# Patient Record
Sex: MALE | Race: WHITE | Employment: OTHER | URBAN - METROPOLITAN AREA
[De-identification: names, ages, dates, MRNs, and addresses within clinical notes are randomized per-mention and may not be internally consistent; named-entity substitution may affect disease eponyms.]

---

## 2017-08-28 PROBLEM — Z86.79 H/O ATRIAL FIBRILLATION WITHOUT CURRENT MEDICATION: Chronic | Status: ACTIVE | Noted: 2017-08-28

## 2017-08-28 PROBLEM — E78.5 HYPERLIPIDEMIA: Chronic | Status: ACTIVE | Noted: 2017-08-28

## 2017-08-28 PROBLEM — Z86.73 H/O: STROKE: Chronic | Status: ACTIVE | Noted: 2017-08-28

## 2018-01-02 PROBLEM — I65.23 ASYMPTOMATIC BILATERAL CAROTID ARTERY STENOSIS: Chronic | Status: ACTIVE | Noted: 2018-01-02

## 2018-02-06 PROBLEM — N32.0 BLADDER OUTLET OBSTRUCTION: Status: ACTIVE | Noted: 2018-02-06

## 2018-02-06 PROBLEM — N39.0 UTI (URINARY TRACT INFECTION): Status: ACTIVE | Noted: 2018-02-06

## 2018-02-06 PROBLEM — N13.30 HYDRONEPHROSIS: Status: ACTIVE | Noted: 2018-02-06

## 2018-02-06 PROBLEM — A41.9 SEPSIS (HCC): Status: ACTIVE | Noted: 2018-02-06

## 2018-03-12 ENCOUNTER — HOSPITAL ENCOUNTER (OUTPATIENT)
Dept: PHARMACY | Age: 83
Discharge: HOME | End: 2018-03-12

## 2018-03-12 ENCOUNTER — TELEPHONE (OUTPATIENT)
Dept: PHARMACY | Age: 83
End: 2018-03-12

## 2018-03-12 DIAGNOSIS — I48.91 ATRIAL FIBRILLATION AND FLUTTER (HCC): ICD-10-CM

## 2018-03-12 DIAGNOSIS — I48.92 ATRIAL FIBRILLATION AND FLUTTER (HCC): ICD-10-CM

## 2018-03-12 LAB — INTERNATIONAL NORMALIZATION RATIO, POC: 3.6

## 2018-03-12 NOTE — PROGRESS NOTES
HOME CARE NOTE:    Patient is currently receiving home care services via: [facility]    Med list updated last on: MED LIST REQUESTED TODAY    INR today is 3.6, goal 2-3.   INR reflects: 2 mg s/m/w/f and 1 mg t/r/s per outside records  Warfarin dosing orders: HOLD today then resume MD    Orders given to: Faustino Tracy  Next INR ordered for: 3/15/18   Next INR will reflect: only 9 mg per week d/t held dose on 3/12

## 2018-03-15 ENCOUNTER — HOSPITAL ENCOUNTER (OUTPATIENT)
Dept: PHARMACY | Age: 83
Discharge: HOME | End: 2018-03-15

## 2018-03-15 LAB — INTERNATIONAL NORMALIZATION RATIO, POC: 2.3

## 2018-03-15 RX ORDER — WARFARIN SODIUM 1 MG/1
1 TABLET ORAL
COMMUNITY
End: 2018-05-25 | Stop reason: ALTCHOICE

## 2018-03-15 NOTE — PROGRESS NOTES
HOME CARE NOTE:    Patient is currently receiving home care services via: [address] list updated last on: 3/15/158    INR today is 2.3, goal 2-3.   INR reflects: 9 mg in past week  Warfarin dosing orders: resume 1 mg t/r/s and 2 mg s/m/w/f    Orders given to: left on Jessica's voicemail  Next INR ordered for:  3/22//18  Next INR will reflect: 11 mg per week

## 2018-04-11 PROBLEM — N39.0 UTI (URINARY TRACT INFECTION): Status: RESOLVED | Noted: 2018-02-06 | Resolved: 2018-04-11

## 2018-04-12 ENCOUNTER — HOSPITAL ENCOUNTER (OUTPATIENT)
Dept: PHARMACY | Age: 83
Discharge: HOME | End: 2018-04-12

## 2018-04-12 LAB — INR BLD: 2.6

## 2018-04-12 NOTE — PROGRESS NOTES
HOME CARE NOTE:    Patient is currently receiving home care services via: [address] list updated last on: 4/12/18    INR today is therapeutic at 2.6, goal 2-3. INR reflects 11 mg per week  Warfarin dosing orders: same (1 mg t/r/s and 2 mg s/m/w/f)    Orders given to:  Santa  Next INR ordered for:  5/3/18  Next INR will reflect: 11 mg per week    Aubrey Hutton PharmD 4/12/2018 9:45 AM

## 2018-05-01 ENCOUNTER — APPOINTMENT (OUTPATIENT)
Dept: GENERAL RADIOLOGY | Age: 83
End: 2018-05-01
Payer: MEDICARE

## 2018-05-01 ENCOUNTER — HOSPITAL ENCOUNTER (EMERGENCY)
Age: 83
Discharge: HOME | End: 2018-05-01
Payer: MEDICARE

## 2018-05-01 VITALS
HEIGHT: 67 IN | DIASTOLIC BLOOD PRESSURE: 51 MMHG | RESPIRATION RATE: 16 BRPM | OXYGEN SATURATION: 92 % | HEART RATE: 73 BPM | SYSTOLIC BLOOD PRESSURE: 99 MMHG | BODY MASS INDEX: 26.37 KG/M2 | TEMPERATURE: 98.7 F | WEIGHT: 168 LBS

## 2018-05-01 DIAGNOSIS — R19.7 NAUSEA VOMITING AND DIARRHEA: Primary | ICD-10-CM

## 2018-05-01 DIAGNOSIS — N30.00 ACUTE CYSTITIS WITHOUT HEMATURIA: ICD-10-CM

## 2018-05-01 DIAGNOSIS — R11.2 NAUSEA VOMITING AND DIARRHEA: Primary | ICD-10-CM

## 2018-05-01 LAB
ALBUMIN SERPL-MCNC: 3.5 G/DL (ref 3.5–5.2)
ALP BLD-CCNC: 83 U/L (ref 40–129)
ALT SERPL-CCNC: 21 U/L (ref 0–40)
AMYLASE: 41 U/L (ref 20–100)
ANION GAP SERPL CALCULATED.3IONS-SCNC: 13 MMOL/L (ref 7–16)
APTT: 38.6 SEC (ref 24.5–35.1)
AST SERPL-CCNC: 35 U/L (ref 0–39)
BACTERIA: ABNORMAL /HPF
BASOPHILS ABSOLUTE: 0.03 E9/L (ref 0–0.2)
BASOPHILS RELATIVE PERCENT: 0.3 % (ref 0–2)
BILIRUB SERPL-MCNC: 0.7 MG/DL (ref 0–1.2)
BILIRUBIN URINE: NEGATIVE
BLOOD, URINE: ABNORMAL
BUN BLDV-MCNC: 18 MG/DL (ref 8–23)
CALCIUM SERPL-MCNC: 8.7 MG/DL (ref 8.6–10.2)
CASTS 2: ABNORMAL /LPF
CASTS: ABNORMAL /LPF
CHLORIDE BLD-SCNC: 100 MMOL/L (ref 98–107)
CLARITY: ABNORMAL
CO2: 24 MMOL/L (ref 22–29)
COLOR: ABNORMAL
CREAT SERPL-MCNC: 0.9 MG/DL (ref 0.7–1.2)
EKG ATRIAL RATE: 77 BPM
EKG P AXIS: 53 DEGREES
EKG P-R INTERVAL: 206 MS
EKG Q-T INTERVAL: 408 MS
EKG QRS DURATION: 84 MS
EKG QTC CALCULATION (BAZETT): 461 MS
EKG R AXIS: 4 DEGREES
EKG T AXIS: 32 DEGREES
EKG VENTRICULAR RATE: 77 BPM
EOSINOPHILS ABSOLUTE: 0.05 E9/L (ref 0.05–0.5)
EOSINOPHILS RELATIVE PERCENT: 0.5 % (ref 0–6)
EPITHELIAL CELLS, UA: ABNORMAL /HPF
GFR AFRICAN AMERICAN: >60
GFR NON-AFRICAN AMERICAN: >60 ML/MIN/1.73
GLUCOSE BLD-MCNC: 298 MG/DL (ref 74–109)
GLUCOSE URINE: 500 MG/DL
HCT VFR BLD CALC: 38.4 % (ref 37–54)
HEMOGLOBIN: 12.6 G/DL (ref 12.5–16.5)
IMMATURE GRANULOCYTES #: 0.09 E9/L
IMMATURE GRANULOCYTES %: 0.8 % (ref 0–5)
INR BLD: 2.7
KETONES, URINE: ABNORMAL MG/DL
LACTIC ACID: 1.5 MMOL/L (ref 0.5–2.2)
LEUKOCYTE ESTERASE, URINE: ABNORMAL
LIPASE: 13 U/L (ref 13–60)
LYMPHOCYTES ABSOLUTE: 1.13 E9/L (ref 1.5–4)
LYMPHOCYTES RELATIVE PERCENT: 10.4 % (ref 20–42)
MCH RBC QN AUTO: 30.4 PG (ref 26–35)
MCHC RBC AUTO-ENTMCNC: 32.8 % (ref 32–34.5)
MCV RBC AUTO: 92.8 FL (ref 80–99.9)
MONOCYTES ABSOLUTE: 1.03 E9/L (ref 0.1–0.95)
MONOCYTES RELATIVE PERCENT: 9.4 % (ref 2–12)
NEUTROPHILS ABSOLUTE: 8.58 E9/L (ref 1.8–7.3)
NEUTROPHILS RELATIVE PERCENT: 78.6 % (ref 43–80)
NITRITE, URINE: POSITIVE
PDW BLD-RTO: 13.7 FL (ref 11.5–15)
PH UA: 5 (ref 5–9)
PLATELET # BLD: 169 E9/L (ref 130–450)
PMV BLD AUTO: 9.9 FL (ref 7–12)
POTASSIUM SERPL-SCNC: 4 MMOL/L (ref 3.5–5)
PROTEIN UA: 100 MG/DL
PROTHROMBIN TIME: 30.8 SEC (ref 9.3–12.4)
RBC # BLD: 4.14 E12/L (ref 3.8–5.8)
RBC UA: >20 /HPF (ref 0–2)
SODIUM BLD-SCNC: 137 MMOL/L (ref 132–146)
SPECIFIC GRAVITY UA: 1.02 (ref 1–1.03)
TOTAL PROTEIN: 6.6 G/DL (ref 6.4–8.3)
TROPONIN: <0.01 NG/ML (ref 0–0.03)
UROBILINOGEN, URINE: 2 E.U./DL
WBC # BLD: 10.9 E9/L (ref 4.5–11.5)
WBC UA: >20 /HPF (ref 0–5)

## 2018-05-01 PROCEDURE — 85025 COMPLETE CBC W/AUTO DIFF WBC: CPT

## 2018-05-01 PROCEDURE — 81001 URINALYSIS AUTO W/SCOPE: CPT

## 2018-05-01 PROCEDURE — 80053 COMPREHEN METABOLIC PANEL: CPT

## 2018-05-01 PROCEDURE — 6360000002 HC RX W HCPCS: Performed by: NURSE PRACTITIONER

## 2018-05-01 PROCEDURE — 6360000002 HC RX W HCPCS

## 2018-05-01 PROCEDURE — 85610 PROTHROMBIN TIME: CPT

## 2018-05-01 PROCEDURE — 83690 ASSAY OF LIPASE: CPT

## 2018-05-01 PROCEDURE — 82150 ASSAY OF AMYLASE: CPT

## 2018-05-01 PROCEDURE — 93005 ELECTROCARDIOGRAM TRACING: CPT | Performed by: NURSE PRACTITIONER

## 2018-05-01 PROCEDURE — 96375 TX/PRO/DX INJ NEW DRUG ADDON: CPT

## 2018-05-01 PROCEDURE — 2580000003 HC RX 258: Performed by: NURSE PRACTITIONER

## 2018-05-01 PROCEDURE — 74018 RADEX ABDOMEN 1 VIEW: CPT

## 2018-05-01 PROCEDURE — 85730 THROMBOPLASTIN TIME PARTIAL: CPT

## 2018-05-01 PROCEDURE — 83605 ASSAY OF LACTIC ACID: CPT

## 2018-05-01 PROCEDURE — 96365 THER/PROPH/DIAG IV INF INIT: CPT

## 2018-05-01 PROCEDURE — 84484 ASSAY OF TROPONIN QUANT: CPT

## 2018-05-01 PROCEDURE — 99284 EMERGENCY DEPT VISIT MOD MDM: CPT

## 2018-05-01 RX ORDER — ONDANSETRON 2 MG/ML
4 INJECTION INTRAMUSCULAR; INTRAVENOUS ONCE
Status: COMPLETED | OUTPATIENT
Start: 2018-05-01 | End: 2018-05-01

## 2018-05-01 RX ORDER — ONDANSETRON 2 MG/ML
INJECTION INTRAMUSCULAR; INTRAVENOUS
Status: COMPLETED
Start: 2018-05-01 | End: 2018-05-01

## 2018-05-01 RX ORDER — 0.9 % SODIUM CHLORIDE 0.9 %
1000 INTRAVENOUS SOLUTION INTRAVENOUS ONCE
Status: COMPLETED | OUTPATIENT
Start: 2018-05-01 | End: 2018-05-01

## 2018-05-01 RX ORDER — NITROFURANTOIN 25; 75 MG/1; MG/1
100 CAPSULE ORAL 2 TIMES DAILY
Qty: 10 CAPSULE | Refills: 0 | Status: SHIPPED | OUTPATIENT
Start: 2018-05-01 | End: 2018-05-06

## 2018-05-01 RX ADMIN — SODIUM CHLORIDE 1000 ML: 9 INJECTION, SOLUTION INTRAVENOUS at 02:00

## 2018-05-01 RX ADMIN — PIPERACILLIN SODIUM AND TAZOBACTAM SODIUM 3.38 G: 3; .375 INJECTION, POWDER, LYOPHILIZED, FOR SOLUTION INTRAVENOUS at 03:56

## 2018-05-01 RX ADMIN — ONDANSETRON 4 MG: 2 INJECTION INTRAMUSCULAR; INTRAVENOUS at 02:02

## 2018-05-11 ENCOUNTER — HOSPITAL ENCOUNTER (OUTPATIENT)
Age: 83
Discharge: HOME | End: 2018-05-13
Payer: MEDICARE

## 2018-05-11 PROCEDURE — 87088 URINE BACTERIA CULTURE: CPT

## 2018-05-11 PROCEDURE — 87186 SC STD MICRODIL/AGAR DIL: CPT

## 2018-05-14 LAB
ORGANISM: ABNORMAL
URINE CULTURE, ROUTINE: ABNORMAL
URINE CULTURE, ROUTINE: ABNORMAL

## 2018-05-25 ENCOUNTER — HOSPITAL ENCOUNTER (OUTPATIENT)
Dept: PHARMACY | Age: 83
Setting detail: THERAPIES SERIES
Discharge: HOME | End: 2018-05-25
Payer: MEDICARE

## 2018-05-25 VITALS
DIASTOLIC BLOOD PRESSURE: 69 MMHG | WEIGHT: 180 LBS | HEART RATE: 69 BPM | BODY MASS INDEX: 28.19 KG/M2 | SYSTOLIC BLOOD PRESSURE: 173 MMHG

## 2018-05-25 LAB — INTERNATIONAL NORMALIZATION RATIO, POC: 2.5

## 2018-05-25 PROCEDURE — 99211 OFF/OP EST MAY X REQ PHY/QHP: CPT

## 2018-05-25 PROCEDURE — 85610 PROTHROMBIN TIME: CPT

## 2018-05-25 RX ORDER — LISINOPRIL 10 MG/1
10 TABLET ORAL EVERY MORNING
COMMUNITY

## 2018-05-25 RX ORDER — WARFARIN SODIUM 1 MG/1
1 TABLET ORAL
Status: ON HOLD | COMMUNITY
Start: 2018-06-15 | End: 2018-07-09 | Stop reason: HOSPADM

## 2018-06-12 ENCOUNTER — OFFICE VISIT (OUTPATIENT)
Dept: NON INVASIVE DIAGNOSTICS | Age: 83
End: 2018-06-12
Payer: MEDICARE

## 2018-06-12 VITALS
DIASTOLIC BLOOD PRESSURE: 50 MMHG | WEIGHT: 179 LBS | SYSTOLIC BLOOD PRESSURE: 110 MMHG | RESPIRATION RATE: 16 BRPM | HEART RATE: 58 BPM | BODY MASS INDEX: 28.09 KG/M2 | HEIGHT: 67 IN

## 2018-06-12 DIAGNOSIS — I48.92 ATRIAL FIBRILLATION AND FLUTTER (HCC): Primary | ICD-10-CM

## 2018-06-12 DIAGNOSIS — I48.91 ATRIAL FIBRILLATION AND FLUTTER (HCC): Primary | ICD-10-CM

## 2018-06-12 PROBLEM — N13.30 HYDRONEPHROSIS: Status: RESOLVED | Noted: 2018-02-06 | Resolved: 2018-06-12

## 2018-06-12 PROBLEM — A41.9 SEPSIS (HCC): Status: RESOLVED | Noted: 2018-02-06 | Resolved: 2018-06-12

## 2018-06-12 PROCEDURE — 1123F ACP DISCUSS/DSCN MKR DOCD: CPT | Performed by: NURSE PRACTITIONER

## 2018-06-12 PROCEDURE — 99213 OFFICE O/P EST LOW 20 MIN: CPT | Performed by: NURSE PRACTITIONER

## 2018-06-12 PROCEDURE — G8427 DOCREV CUR MEDS BY ELIG CLIN: HCPCS | Performed by: NURSE PRACTITIONER

## 2018-06-12 PROCEDURE — G8419 CALC BMI OUT NRM PARAM NOF/U: HCPCS | Performed by: NURSE PRACTITIONER

## 2018-06-12 PROCEDURE — G8598 ASA/ANTIPLAT THER USED: HCPCS | Performed by: NURSE PRACTITIONER

## 2018-06-12 PROCEDURE — 1036F TOBACCO NON-USER: CPT | Performed by: NURSE PRACTITIONER

## 2018-06-12 PROCEDURE — 93000 ELECTROCARDIOGRAM COMPLETE: CPT | Performed by: INTERNAL MEDICINE

## 2018-06-12 PROCEDURE — 4040F PNEUMOC VAC/ADMIN/RCVD: CPT | Performed by: NURSE PRACTITIONER

## 2018-06-15 ENCOUNTER — TELEPHONE (OUTPATIENT)
Dept: PHARMACY | Age: 83
End: 2018-06-15

## 2018-06-15 ENCOUNTER — HOSPITAL ENCOUNTER (OUTPATIENT)
Dept: PHARMACY | Age: 83
Setting detail: THERAPIES SERIES
Discharge: HOME | End: 2018-06-15
Payer: MEDICARE

## 2018-06-15 VITALS
BODY MASS INDEX: 28.22 KG/M2 | DIASTOLIC BLOOD PRESSURE: 86 MMHG | SYSTOLIC BLOOD PRESSURE: 139 MMHG | WEIGHT: 180.2 LBS | HEART RATE: 63 BPM

## 2018-06-15 LAB — INTERNATIONAL NORMALIZATION RATIO, POC: 2.4

## 2018-06-15 PROCEDURE — 99211 OFF/OP EST MAY X REQ PHY/QHP: CPT

## 2018-06-15 PROCEDURE — 85610 PROTHROMBIN TIME: CPT

## 2018-06-26 ENCOUNTER — TELEPHONE (OUTPATIENT)
Dept: NON INVASIVE DIAGNOSTICS | Age: 83
End: 2018-06-26

## 2018-07-05 ENCOUNTER — APPOINTMENT (OUTPATIENT)
Dept: CT IMAGING | Age: 83
DRG: 871 | End: 2018-07-05
Payer: MEDICARE

## 2018-07-05 ENCOUNTER — HOSPITAL ENCOUNTER (INPATIENT)
Age: 83
LOS: 4 days | Discharge: NURSING, REHAB OR LONG-TERM CARE | DRG: 871 | End: 2018-07-09
Attending: EMERGENCY MEDICINE | Admitting: INTERNAL MEDICINE
Payer: MEDICARE

## 2018-07-05 DIAGNOSIS — R53.1 GENERAL WEAKNESS: ICD-10-CM

## 2018-07-05 DIAGNOSIS — N30.00 ACUTE CYSTITIS WITHOUT HEMATURIA: Primary | ICD-10-CM

## 2018-07-05 PROBLEM — G93.41 ACUTE METABOLIC ENCEPHALOPATHY: Status: ACTIVE | Noted: 2018-07-05

## 2018-07-05 PROBLEM — N30.01 ACUTE CYSTITIS WITH HEMATURIA: Status: ACTIVE | Noted: 2018-07-05

## 2018-07-05 PROBLEM — N39.0 UTI (URINARY TRACT INFECTION): Status: ACTIVE | Noted: 2018-07-05

## 2018-07-05 PROBLEM — Z86.79 HISTORY OF ATRIAL FIBRILLATION: Status: ACTIVE | Noted: 2018-07-05

## 2018-07-05 LAB
ANION GAP SERPL CALCULATED.3IONS-SCNC: 10 MMOL/L (ref 7–16)
APTT: 36.5 SEC (ref 24.5–35.1)
BACTERIA: ABNORMAL /HPF
BASOPHILS ABSOLUTE: 0.04 E9/L (ref 0–0.2)
BASOPHILS RELATIVE PERCENT: 0.3 % (ref 0–2)
BILIRUBIN URINE: NEGATIVE
BLOOD, URINE: ABNORMAL
BUN BLDV-MCNC: 19 MG/DL (ref 8–23)
CALCIUM SERPL-MCNC: 8.2 MG/DL (ref 8.6–10.2)
CHLORIDE BLD-SCNC: 96 MMOL/L (ref 98–107)
CLARITY: CLEAR
CO2: 29 MMOL/L (ref 22–29)
COLOR: YELLOW
CREAT SERPL-MCNC: 1.1 MG/DL (ref 0.7–1.2)
EKG ATRIAL RATE: 63 BPM
EKG P AXIS: 133 DEGREES
EKG P-R INTERVAL: 212 MS
EKG Q-T INTERVAL: 462 MS
EKG QRS DURATION: 84 MS
EKG QTC CALCULATION (BAZETT): 472 MS
EKG R AXIS: -3 DEGREES
EKG T AXIS: 24 DEGREES
EKG VENTRICULAR RATE: 63 BPM
EOSINOPHILS ABSOLUTE: 0.06 E9/L (ref 0.05–0.5)
EOSINOPHILS RELATIVE PERCENT: 0.5 % (ref 0–6)
GFR AFRICAN AMERICAN: >60
GFR NON-AFRICAN AMERICAN: >60 ML/MIN/1.73
GLUCOSE BLD-MCNC: 255 MG/DL (ref 74–109)
GLUCOSE URINE: NEGATIVE MG/DL
HCT VFR BLD CALC: 35.8 % (ref 37–54)
HEMOGLOBIN: 11.6 G/DL (ref 12.5–16.5)
IMMATURE GRANULOCYTES #: 0.09 E9/L
IMMATURE GRANULOCYTES %: 0.7 % (ref 0–5)
INR BLD: 3.2
KETONES, URINE: NEGATIVE MG/DL
LACTIC ACID: 2.3 MMOL/L (ref 0.5–2.2)
LEUKOCYTE ESTERASE, URINE: ABNORMAL
LYMPHOCYTES ABSOLUTE: 0.73 E9/L (ref 1.5–4)
LYMPHOCYTES RELATIVE PERCENT: 5.7 % (ref 20–42)
MCH RBC QN AUTO: 30.4 PG (ref 26–35)
MCHC RBC AUTO-ENTMCNC: 32.4 % (ref 32–34.5)
MCV RBC AUTO: 93.7 FL (ref 80–99.9)
METER GLUCOSE: 177 MG/DL (ref 70–110)
METER GLUCOSE: 178 MG/DL (ref 70–110)
MONOCYTES ABSOLUTE: 0.62 E9/L (ref 0.1–0.95)
MONOCYTES RELATIVE PERCENT: 4.8 % (ref 2–12)
NEUTROPHILS ABSOLUTE: 11.35 E9/L (ref 1.8–7.3)
NEUTROPHILS RELATIVE PERCENT: 88 % (ref 43–80)
NITRITE, URINE: POSITIVE
PDW BLD-RTO: 13.9 FL (ref 11.5–15)
PH UA: 6 (ref 5–9)
PLATELET # BLD: 155 E9/L (ref 130–450)
PMV BLD AUTO: 10.7 FL (ref 7–12)
POTASSIUM REFLEX MAGNESIUM: 4.1 MMOL/L (ref 3.5–5)
PROTEIN UA: 100 MG/DL
PROTHROMBIN TIME: 34.9 SEC (ref 9.3–12.4)
RBC # BLD: 3.82 E12/L (ref 3.8–5.8)
RBC UA: ABNORMAL /HPF (ref 0–2)
SODIUM BLD-SCNC: 135 MMOL/L (ref 132–146)
SPECIFIC GRAVITY UA: 1.01 (ref 1–1.03)
UROBILINOGEN, URINE: 2 E.U./DL
WBC # BLD: 12.9 E9/L (ref 4.5–11.5)
WBC UA: ABNORMAL /HPF (ref 0–5)

## 2018-07-05 PROCEDURE — 99223 1ST HOSP IP/OBS HIGH 75: CPT | Performed by: INTERNAL MEDICINE

## 2018-07-05 PROCEDURE — 36415 COLL VENOUS BLD VENIPUNCTURE: CPT

## 2018-07-05 PROCEDURE — 99285 EMERGENCY DEPT VISIT HI MDM: CPT

## 2018-07-05 PROCEDURE — 6360000002 HC RX W HCPCS: Performed by: EMERGENCY MEDICINE

## 2018-07-05 PROCEDURE — 83605 ASSAY OF LACTIC ACID: CPT

## 2018-07-05 PROCEDURE — 2580000003 HC RX 258: Performed by: EMERGENCY MEDICINE

## 2018-07-05 PROCEDURE — 87088 URINE BACTERIA CULTURE: CPT

## 2018-07-05 PROCEDURE — 70486 CT MAXILLOFACIAL W/O DYE: CPT

## 2018-07-05 PROCEDURE — 82962 GLUCOSE BLOOD TEST: CPT

## 2018-07-05 PROCEDURE — 80048 BASIC METABOLIC PNL TOTAL CA: CPT

## 2018-07-05 PROCEDURE — 1200000000 HC SEMI PRIVATE

## 2018-07-05 PROCEDURE — 94761 N-INVAS EAR/PLS OXIMETRY MLT: CPT

## 2018-07-05 PROCEDURE — 87186 SC STD MICRODIL/AGAR DIL: CPT

## 2018-07-05 PROCEDURE — 2580000003 HC RX 258: Performed by: NURSE PRACTITIONER

## 2018-07-05 PROCEDURE — 93005 ELECTROCARDIOGRAM TRACING: CPT | Performed by: EMERGENCY MEDICINE

## 2018-07-05 PROCEDURE — 6370000000 HC RX 637 (ALT 250 FOR IP): Performed by: NURSE PRACTITIONER

## 2018-07-05 PROCEDURE — 70450 CT HEAD/BRAIN W/O DYE: CPT

## 2018-07-05 PROCEDURE — 85610 PROTHROMBIN TIME: CPT

## 2018-07-05 PROCEDURE — 96365 THER/PROPH/DIAG IV INF INIT: CPT

## 2018-07-05 PROCEDURE — 85025 COMPLETE CBC W/AUTO DIFF WBC: CPT

## 2018-07-05 PROCEDURE — 85730 THROMBOPLASTIN TIME PARTIAL: CPT

## 2018-07-05 PROCEDURE — 81001 URINALYSIS AUTO W/SCOPE: CPT

## 2018-07-05 RX ORDER — SIMVASTATIN 40 MG
40 TABLET ORAL NIGHTLY
Status: DISCONTINUED | OUTPATIENT
Start: 2018-07-05 | End: 2018-07-09 | Stop reason: HOSPADM

## 2018-07-05 RX ORDER — SODIUM CHLORIDE 0.9 % (FLUSH) 0.9 %
10 SYRINGE (ML) INJECTION EVERY 12 HOURS SCHEDULED
Status: DISCONTINUED | OUTPATIENT
Start: 2018-07-05 | End: 2018-07-09 | Stop reason: HOSPADM

## 2018-07-05 RX ORDER — INSULIN GLARGINE 100 [IU]/ML
23 INJECTION, SOLUTION SUBCUTANEOUS EVERY MORNING
Status: DISCONTINUED | OUTPATIENT
Start: 2018-07-06 | End: 2018-07-09 | Stop reason: HOSPADM

## 2018-07-05 RX ORDER — SODIUM CHLORIDE 0.9 % (FLUSH) 0.9 %
10 SYRINGE (ML) INJECTION PRN
Status: DISCONTINUED | OUTPATIENT
Start: 2018-07-05 | End: 2018-07-09 | Stop reason: HOSPADM

## 2018-07-05 RX ORDER — MIRTAZAPINE 15 MG/1
15 TABLET, FILM COATED ORAL NIGHTLY
Status: DISCONTINUED | OUTPATIENT
Start: 2018-07-05 | End: 2018-07-09 | Stop reason: HOSPADM

## 2018-07-05 RX ORDER — VITS A,C,E/LUTEIN/MINERALS 300MCG-200
1 TABLET ORAL 2 TIMES DAILY
Status: DISCONTINUED | OUTPATIENT
Start: 2018-07-05 | End: 2018-07-09 | Stop reason: HOSPADM

## 2018-07-05 RX ORDER — NITROFURANTOIN MACROCRYSTALS 50 MG/1
50 CAPSULE ORAL SEE ADMIN INSTRUCTIONS
Status: ON HOLD | COMMUNITY
End: 2018-07-09 | Stop reason: HOSPADM

## 2018-07-05 RX ORDER — DEXTROSE MONOHYDRATE 25 G/50ML
12.5 INJECTION, SOLUTION INTRAVENOUS PRN
Status: DISCONTINUED | OUTPATIENT
Start: 2018-07-05 | End: 2018-07-09 | Stop reason: HOSPADM

## 2018-07-05 RX ORDER — WARFARIN SODIUM 2 MG/1
2 TABLET ORAL
Status: ON HOLD | COMMUNITY
End: 2018-07-09 | Stop reason: HOSPADM

## 2018-07-05 RX ORDER — NICOTINE POLACRILEX 4 MG
15 LOZENGE BUCCAL PRN
Status: DISCONTINUED | OUTPATIENT
Start: 2018-07-05 | End: 2018-07-09 | Stop reason: HOSPADM

## 2018-07-05 RX ORDER — FOLIC ACID 1 MG/1
1 TABLET ORAL EVERY MORNING
Status: DISCONTINUED | OUTPATIENT
Start: 2018-07-06 | End: 2018-07-09 | Stop reason: HOSPADM

## 2018-07-05 RX ORDER — 0.9 % SODIUM CHLORIDE 0.9 %
1000 INTRAVENOUS SOLUTION INTRAVENOUS ONCE
Status: COMPLETED | OUTPATIENT
Start: 2018-07-05 | End: 2018-07-05

## 2018-07-05 RX ORDER — DEXTROSE MONOHYDRATE 50 MG/ML
100 INJECTION, SOLUTION INTRAVENOUS PRN
Status: DISCONTINUED | OUTPATIENT
Start: 2018-07-05 | End: 2018-07-09 | Stop reason: HOSPADM

## 2018-07-05 RX ORDER — ANTIOX #8/OM3/DHA/EPA/LUT/ZEAX 250-2.5 MG
1 CAPSULE ORAL 2 TIMES DAILY
Status: DISCONTINUED | OUTPATIENT
Start: 2018-07-05 | End: 2018-07-05 | Stop reason: CLARIF

## 2018-07-05 RX ORDER — SOTALOL HYDROCHLORIDE 120 MG/1
120 TABLET ORAL EVERY MORNING
Status: DISCONTINUED | OUTPATIENT
Start: 2018-07-06 | End: 2018-07-09 | Stop reason: HOSPADM

## 2018-07-05 RX ORDER — LISINOPRIL 10 MG/1
10 TABLET ORAL EVERY MORNING
Status: DISCONTINUED | OUTPATIENT
Start: 2018-07-06 | End: 2018-07-09 | Stop reason: HOSPADM

## 2018-07-05 RX ADMIN — SODIUM CHLORIDE 1000 ML: 9 INJECTION, SOLUTION INTRAVENOUS at 10:46

## 2018-07-05 RX ADMIN — Medication 10 ML: at 20:50

## 2018-07-05 RX ADMIN — MEROPENEM 1 G: 1 INJECTION, POWDER, FOR SOLUTION INTRAVENOUS at 14:53

## 2018-07-05 RX ADMIN — MEROPENEM 1 G: 1 INJECTION, POWDER, FOR SOLUTION INTRAVENOUS at 22:47

## 2018-07-05 RX ADMIN — SIMVASTATIN 40 MG: 40 TABLET, FILM COATED ORAL at 20:50

## 2018-07-05 RX ADMIN — Medication 1 TABLET: at 20:50

## 2018-07-05 RX ADMIN — MIRTAZAPINE 15 MG: 15 TABLET, FILM COATED ORAL at 20:50

## 2018-07-05 RX ADMIN — INSULIN LISPRO 1 UNITS: 100 INJECTION, SOLUTION INTRAVENOUS; SUBCUTANEOUS at 20:49

## 2018-07-05 ASSESSMENT — PAIN SCALES - GENERAL
PAINLEVEL_OUTOF10: 0
PAINLEVEL_OUTOF10: 0

## 2018-07-05 NOTE — ED PROVIDER NOTES
[oxycodone-acetaminophen]    -------------------------------------------------- RESULTS -------------------------------------------------  All laboratory and radiology results have been personally reviewed by myself   LABS:  Results for orders placed or performed during the hospital encounter of 07/05/18   Urine culture   Result Value Ref Range    Urine Culture, Routine      Organism Escherichia coli (A)     Urine Culture, Routine >100,000 CFU/ml  Sensitivity to follow      CBC auto differential   Result Value Ref Range    WBC 12.9 (H) 4.5 - 11.5 E9/L    RBC 3.82 3.80 - 5.80 E12/L    Hemoglobin 11.6 (L) 12.5 - 16.5 g/dL    Hematocrit 35.8 (L) 37.0 - 54.0 %    MCV 93.7 80.0 - 99.9 fL    MCH 30.4 26.0 - 35.0 pg    MCHC 32.4 32.0 - 34.5 %    RDW 13.9 11.5 - 15.0 fL    Platelets 510 666 - 125 E9/L    MPV 10.7 7.0 - 12.0 fL    Neutrophils % 88.0 (H) 43.0 - 80.0 %    Immature Granulocytes % 0.7 0.0 - 5.0 %    Lymphocytes % 5.7 (L) 20.0 - 42.0 %    Monocytes % 4.8 2.0 - 12.0 %    Eosinophils % 0.5 0.0 - 6.0 %    Basophils % 0.3 0.0 - 2.0 %    Neutrophils # 11.35 (H) 1.80 - 7.30 E9/L    Immature Granulocytes # 0.09 E9/L    Lymphocytes # 0.73 (L) 1.50 - 4.00 E9/L    Monocytes # 0.62 0.10 - 0.95 E9/L    Eosinophils # 0.06 0.05 - 0.50 E9/L    Basophils # 0.04 0.00 - 0.20 E9/L   Urinalysis   Result Value Ref Range    Color, UA Yellow Straw/Yellow    Clarity, UA Clear Clear    Glucose, Ur Negative Negative mg/dL    Bilirubin Urine Negative Negative    Ketones, Urine Negative Negative mg/dL    Specific Gravity, UA 1.010 1.005 - 1.030    Blood, Urine LARGE (A) Negative    pH, UA 6.0 5.0 - 9.0    Protein,  (A) Negative mg/dL    Urobilinogen, Urine 2.0 (A) <2.0 E.U./dL    Nitrite, Urine POSITIVE (A) Negative    Leukocyte Esterase, Urine MODERATE (A) Negative   Lactic acid, plasma   Result Value Ref Range    Lactic Acid 2.3 (H) 0.5 - 2.2 mmol/L   Basic Metabolic Panel w/ Reflex to MG   Result Value Ref Range    Sodium 135 132 - Glucose   Result Value Ref Range    Meter Glucose 167 (H) 70 - 110 mg/dL   POCT Glucose   Result Value Ref Range    Meter Glucose 164 (H) 70 - 110 mg/dL   EKG 12 Lead   Result Value Ref Range    Ventricular Rate 63 BPM    Atrial Rate 63 BPM    P-R Interval 212 ms    QRS Duration 84 ms    Q-T Interval 462 ms    QTc Calculation (Bazett) 472 ms    P Axis 133 degrees    R Axis -3 degrees    T Axis 24 degrees       RADIOLOGY:  Interpreted by Radiologist.  CT ABDOMEN PELVIS WO CONTRAST   Final Result      CT FACIAL BONES WO CONTRAST   Final Result   1. Study limited by suboptimal postprocessing. Reprocessed images have   been requested. 2. No obvious fracture. 3. 7-mm x 3-mm foreign body in the subcutaneous tissue overlying the   left zygoma, possibly a small piece of metal.      4. Mild maxillary sinus mucosal thickening, left greater than right. CT Head WO Contrast   Final Result   ALERT THIS IS AN ABNORMAL REPORT. Findings communicated   directly with Dr. Castro Landon at approximately 7/5/2018 at 1137 hours. 1. There is a possible fracture of the left maxilla versus motion   artifact. CT facial bones without contrast is recommended. 2. Moderate cerebral volume loss/atrophy. 3. Moderate white matter changes consistent with sequelae small vessel   ischemic disease. 4. Vascular calcifications within the bilateral internal carotid   artery cavernous segments and the vertebral arteries. .   5. Small amount layering fluid in the bilateral maxillary sinuses. ------------------------- NURSING NOTES AND VITALS REVIEWED ---------------------------   The nursing notes within the ED encounter and vital signs as below have been reviewed.    /60   Pulse 93   Temp 98 °F (36.7 °C) (Oral)   Resp 18   Ht 5' 7\" (1.702 m)   Wt 186 lb (84.4 kg)   SpO2 100%   BMI 29.13 kg/m²   Oxygen Saturation Interpretation: Normal      ---------------------------------------------------PHYSICAL injection 1 mg (not administered)   dextrose 5 % solution (not administered)   warfarin (COUMADIN) tablet 2 mg (not administered)   ertapenem (INVANZ) 1 g IVPB minibag (0 mg Intravenous Stopped 7/6/18 1352)   0.9 % sodium chloride bolus (0 mLs Intravenous Stopped 7/5/18 1234)         Medical Decision Making:    Check labs and urine---findings noted---[facility] Clinical Pharmacy---will begin Meropenem due to ESBL---give IVF---CT head negative----no facial fracture----no metal noted on clinical exam----DW Dr Alex Brennan and Dr Prince Watson for admit    Counseling: The emergency provider has spoken with the patient and spouse/SO and discussed todays results, in addition to providing specific details for the plan of care and counseling regarding the diagnosis and prognosis. Questions are answered at this time and they are agreeable with the plan.      --------------------------------- IMPRESSION AND DISPOSITION ---------------------------------    IMPRESSION  1. Acute cystitis without hematuria    2.  General weakness        DISPOSITION  Disposition: Admit to telemetry  Patient condition is stable                  Ananth Barnhart MD  07/06/18 0535

## 2018-07-05 NOTE — H&P
Addendum: I have personally participated in the history, exam, medical decision making with Magali Earl NP on the date of service and I agree with all of the pertinent clinical information unless otherwise noted. I have also reviewed and agree with the past medical, family, and social history unless otherwise noted. Please link this note to the NP/AP note dated 7/5/2018   80year-old male presented with 3 day history of confusion and fever with hematuria    PHYSICAL EXAM:  Vitals:  /60   Pulse 80   Temp 99.8 °F (37.7 °C) (Oral)   Resp 18   Ht 5' 7\" (1.702 m)   Wt 180 lb (81.6 kg)   SpO2 95%   BMI 28.19 kg/m²   Lungs are clear to auscultation bilaterally no crackles no wheezing. Heart D5-F3, systolic murmur  Abdomen soft nontender nondistended positive bowel sounds. Extremities full range of motion +1 pedal edema. Impression:  Principal Problem:    Acute cystitis with hematuria  Active Problems:    DM2 (diabetes mellitus, type 2) ([facility] [address]    Hyperlipidemia    Essential hypertension    General weakness    UTI (urinary tract infection)    History of atrial fibrillation  Resolved Problems:    * No resolved hospital problems. *      My findings/plan include:  1. Acute metabolic encephalopathy, most likely due to UTI, we will treat underlying process and monitor. 2.  UTI complicated with sepsis, meeting QSofa, previous cultures showed ESBL E. coli, will start the patient on meropenem and consult ID. Unfortunately was not allowed to use ertapenem. 3.  Hematuria, most likely due to acute cystitis, consult to neurology. 4.  Falls due to encephalopathy, will obtain PT and OT    NOTE: This report was transcribed using voice recognition software. Every effort was made to ensure accuracy; however, inadvertent computerized transcription errors may be present.   Electronically signed by Laurel Florian MD on 7/5/2018 at 6:18 PM

## 2018-07-05 NOTE — H&P
History of nephrolithiasis     [place] (hard of hearing)     Hyperlipidemia     Hypertension     Macular degeneration     Skin cancer of scalp 07/2014    Unspecified cerebral artery occlusion with cerebral infarction     mini strokes    Uses walker        Surgical History:  Past Surgical History:   Procedure Laterality Date    CAROTID ENDARTERECTOMY Left 10-7-14    Dr. Mickey Ojeda  11/18/2014    cysto retrograde TURP pyelogram    CYSTOSCOPY Left 07/11/2017    Ureteroscopy; Laser [place]-B-Tfvay on Left    CYSTOSCOPY Left 08/24/2017    Stent removal and replacement    HERNIA REPAIR      KIDNEY STONE SURGERY      SKIN BIOPSY  7/2014    at top of head       Medications Prior to Admission:    Prior to Admission medications    Medication Sig Start Date End Date Taking? Authorizing Provider   warfarin (COUMADIN) 2 MG tablet Take 2 mg by mouth four times a week 2 mg= Sunday, Monday, Wednesday, Friday (taken with supper)   Yes Historical Provider, MD   nitrofurantoin (MACRODANTIN) 50 MG capsule Take 50 mg by mouth See Admin Instructions Onset of symptoms: Take 1 capsule by mouth Twice Daily for 3 days.    Yes Historical Provider, MD   sotalol (BETAPACE) 80 MG tablet Take 1.5 tablets by mouth daily  Patient taking differently: Take 120 mg by mouth every morning  6/25/18  Yes Haley Lee MD   saxagliptin (ONGLYZA) 2.5 MG TABS tablet Take 5 mg by mouth every morning    Yes Historical Provider, MD   insulin glargine (LANTUS SOLOSTAR) 100 UNIT/ML injection pen Inject 23 Units into the skin every morning    Yes Historical Provider, MD   lisinopril (PRINIVIL;ZESTRIL) 10 MG tablet Take 10 mg by mouth every morning    Yes Historical Provider, MD   warfarin (JANTOVEN) 1 MG tablet Take 1 mg by mouth three times a week 1 mg= Tuesday, Thursday, Saturday (taken with supper)   Take as directed by [address] [facility] (Medication Management) 6/15/18  Yes Historical Provider, MD   mirtazapine (Sen Terrance) 15 MG tablet Take 15 mg by mouth nightly   Yes Historical Provider, MD   acetaminophen (TYLENOL) 500 MG tablet Take 1,000 mg by mouth every 6 hours as needed for Pain or Fever    Yes Historical Provider, MD   Multiple Vitamins-Minerals (PRESERVISION AREDS 2) CAPS Take 1 tablet by mouth 2 times daily    Yes Historical Provider, MD   b complex vitamins capsule Take 1 capsule by mouth every morning    Yes Historical Provider, MD   simvastatin (ZOCOR) 40 MG tablet Take 40 mg by mouth nightly. Yes Historical Provider, MD   folic acid (FOLVITE) 1 MG tablet Take 1 mg by mouth every morning    Yes Historical Provider, MD   aspirin 81 MG EC tablet Take 81 mg by mouth every morning     Historical Provider, MD       Allergies:    Bacitracin and Percocet [oxycodone-acetaminophen]    Social History:    reports that he quit smoking about 19 years ago. His smoking use included Cigarettes. He started smoking about 63 years ago. He smoked 1.00 pack per day. His smokeless tobacco use includes Snuff. He reports that he does not drink alcohol or use drugs. Family History:   Family History   Problem Relation Age of Onset    Heart Disease Mother     Heart Disease Father     Heart Disease Brother        PHYSICAL EXAM:  Vitals:  /60   Pulse 80   Temp 99.8 °F (37.7 °C) (Oral)   Resp 18   Ht 5' 7\" (1.702 m)   Wt 180 lb (81.6 kg)   SpO2 95%   BMI 28.19 kg/m²   General Appearance: alert and oriented to person, place and time, well-developed and well-nourished, in no acute distress  Skin: warm and dry. Reddened abrasion areas noted to right facial region   Head: normocephalic and atraumatic  Eyes: pupils equal, round, and reactive to light, extraocular eye movements intact, conjunctivae normal  ENT: hearing grossly normal bilaterally  Neck: neck supple and non tender without mass   Pulmonary/Chest: clear to auscultation bilaterally- no wheezes, rales or rhonchi, normal air movement, no respiratory distress.  On room examination was performed on a CT scanner with dose reduction. Technique: Low-dose CT  acquisition technique included one of following options; 1 . Automated exposure control, 2. Adjustment of MA and or KV according to patient's size or 3. Use of iterative reconstruction. No intraparenchymal hemorrhage. No extra-axial fluid collection or hematoma. Vascular calcifications within the bilateral internal carotid artery cavernous segments and the vertebral arteries. . Moderate cerebral body loss/atrophy. Hypodensities bilateral centrum semiovale and periventricular regions. Layering fluid noted in the maxillary sinuses. Possible fracture of the left maxilla versus possible motion artifact. . Hypodensities in the bilateral centrum semiovale and periventricular regions. ALERT THIS IS AN ABNORMAL REPORT. Findings communicated directly with Dr. Janet Parks at approximately 2018 at 1137 hours. 1. There is a possible fracture of the left maxilla versus motion artifact. CT facial bones without contrast is recommended. 2. Moderate cerebral volume loss/atrophy. 3. Moderate white matter changes consistent with sequelae small vessel ischemic disease. 4. Vascular calcifications within the bilateral internal carotid artery cavernous segments and the vertebral arteries. . 5. Small amount layering fluid in the bilateral maxillary sinuses. Ct Facial Bones Wo Contrast    Result Date: 2018  Patient MRN:  [id number] : 1935 Age: 80 years Gender: Male Order Date:  2018 11:45 AM Exam: CT FACIAL BONES WO CONTRAST Indication:  maxillary fracture  Number of Images: 814 Technique: Computed tomographic imaging of the facial bones was performed without intravenous contrast. Images were reconstructed in the axial plane in thin sections using standard and bone algorithms. Images were also reformatted in coronal and sagittal planes. Comparison: Correlation is made with contemporaneously performed CT study of the head.  FINDINGS:  The study is limited secondary to obliquity in the reconstructed imaging planes and lack of true bone algorithm reconstructions in the nonaxial planes. A small, 7-mm x 3-mm foreign body is noted in the subcutaneous tissue overlying the left zygoma. No obvious fracture is identified. There is mild mucosal thickening in the maxillary sinuses, left greater than right. The nasal septum is deviated to the right. The optic globes appear intact. The extraocular muscles are grossly unremarkable. There is no infiltration of the orbital fat. The pharynx is clear and unremarkable. The parapharyngeal soft tissues are grossly unremarkable. 1. Study limited by suboptimal postprocessing. Reprocessed images have been requested. 2. No obvious fracture. 3. 7-mm x 3-mm foreign body in the subcutaneous tissue overlying the left zygoma, possibly a small piece of metal. 4. Mild maxillary sinus mucosal thickening, left greater than right.       EK2018 11:02 AM - Eloy, Mhy Incoming Ekg Results From [facility]     Component Results     Component Value Ref Range & Units Status Collected Lab   Ventricular Rate 63  BPM Incomplete 2018 10:50 AM [facility]   Atrial Rate 63  BPM Incomplete 2018 10:50 AM [facility]   P-R Interval 212  ms Incomplete 2018 10:50 AM [facility]   QRS Duration 84  ms Incomplete 2018 10:50 AM [facility]   Q-T Interval 462  ms Incomplete 2018 10:50 AM [facility]   QTc Calculation (Bazett) 472  ms Incomplete 2018 10:50 AM [facility]   P Pool 133  degrees Incomplete 2018 10:50 AM [facility]   R Axis -3  degrees Incomplete 2018 10:50 AM [facility]   T Pool 24  degrees Incomplete 2018 10:50 AM [facility]   Testing Performed By     Lab - Abbreviation Name Director Address Valid Date Range   360-[facility] MUSE Unknown Unknown 16 1121-Present   Narrative     Unusual P axis, possible ectopic atrial rhythm  Low voltage QRS         ASSESSMENT:      Principal Problem:   Acute metabolic

## 2018-07-06 ENCOUNTER — APPOINTMENT (OUTPATIENT)
Dept: CT IMAGING | Age: 83
DRG: 871 | End: 2018-07-06
Payer: MEDICARE

## 2018-07-06 LAB
ALBUMIN SERPL-MCNC: 3.4 G/DL (ref 3.5–5.2)
ALP BLD-CCNC: 86 U/L (ref 40–129)
ALT SERPL-CCNC: 19 U/L (ref 0–40)
ANION GAP SERPL CALCULATED.3IONS-SCNC: 10 MMOL/L (ref 7–16)
AST SERPL-CCNC: 25 U/L (ref 0–39)
BILIRUB SERPL-MCNC: 0.9 MG/DL (ref 0–1.2)
BUN BLDV-MCNC: 14 MG/DL (ref 8–23)
CALCIUM SERPL-MCNC: 8.8 MG/DL (ref 8.6–10.2)
CHLORIDE BLD-SCNC: 100 MMOL/L (ref 98–107)
CO2: 29 MMOL/L (ref 22–29)
CREAT SERPL-MCNC: 0.9 MG/DL (ref 0.7–1.2)
GFR AFRICAN AMERICAN: >60
GFR NON-AFRICAN AMERICAN: >60 ML/MIN/1.73
GLUCOSE BLD-MCNC: 141 MG/DL (ref 74–109)
HCT VFR BLD CALC: 37.6 % (ref 37–54)
HEMOGLOBIN: 12.3 G/DL (ref 12.5–16.5)
INR BLD: 2.3
MCH RBC QN AUTO: 30.6 PG (ref 26–35)
MCHC RBC AUTO-ENTMCNC: 32.7 % (ref 32–34.5)
MCV RBC AUTO: 93.5 FL (ref 80–99.9)
METER GLUCOSE: 147 MG/DL (ref 70–110)
METER GLUCOSE: 164 MG/DL (ref 70–110)
METER GLUCOSE: 167 MG/DL (ref 70–110)
METER GLUCOSE: 189 MG/DL (ref 70–110)
PDW BLD-RTO: 13.9 FL (ref 11.5–15)
PLATELET # BLD: 148 E9/L (ref 130–450)
PMV BLD AUTO: 9.8 FL (ref 7–12)
POTASSIUM REFLEX MAGNESIUM: 3.8 MMOL/L (ref 3.5–5)
PROTHROMBIN TIME: 26.6 SEC (ref 9.3–12.4)
RBC # BLD: 4.02 E12/L (ref 3.8–5.8)
SODIUM BLD-SCNC: 139 MMOL/L (ref 132–146)
TOTAL PROTEIN: 6.4 G/DL (ref 6.4–8.3)
WBC # BLD: 12.4 E9/L (ref 4.5–11.5)

## 2018-07-06 PROCEDURE — G8987 SELF CARE CURRENT STATUS: HCPCS

## 2018-07-06 PROCEDURE — 97530 THERAPEUTIC ACTIVITIES: CPT

## 2018-07-06 PROCEDURE — 1200000000 HC SEMI PRIVATE

## 2018-07-06 PROCEDURE — 36415 COLL VENOUS BLD VENIPUNCTURE: CPT

## 2018-07-06 PROCEDURE — G8978 MOBILITY CURRENT STATUS: HCPCS

## 2018-07-06 PROCEDURE — 97161 PT EVAL LOW COMPLEX 20 MIN: CPT

## 2018-07-06 PROCEDURE — G8979 MOBILITY GOAL STATUS: HCPCS

## 2018-07-06 PROCEDURE — 6360000002 HC RX W HCPCS: Performed by: SPECIALIST

## 2018-07-06 PROCEDURE — 2580000003 HC RX 258: Performed by: EMERGENCY MEDICINE

## 2018-07-06 PROCEDURE — 6370000000 HC RX 637 (ALT 250 FOR IP): Performed by: NURSE PRACTITIONER

## 2018-07-06 PROCEDURE — G8988 SELF CARE GOAL STATUS: HCPCS

## 2018-07-06 PROCEDURE — 80053 COMPREHEN METABOLIC PANEL: CPT

## 2018-07-06 PROCEDURE — 85027 COMPLETE CBC AUTOMATED: CPT

## 2018-07-06 PROCEDURE — 85610 PROTHROMBIN TIME: CPT

## 2018-07-06 PROCEDURE — 2580000003 HC RX 258: Performed by: NURSE PRACTITIONER

## 2018-07-06 PROCEDURE — 82962 GLUCOSE BLOOD TEST: CPT

## 2018-07-06 PROCEDURE — 74176 CT ABD & PELVIS W/O CONTRAST: CPT

## 2018-07-06 PROCEDURE — 6360000002 HC RX W HCPCS: Performed by: EMERGENCY MEDICINE

## 2018-07-06 PROCEDURE — 99233 SBSQ HOSP IP/OBS HIGH 50: CPT | Performed by: HOSPITALIST

## 2018-07-06 PROCEDURE — 2580000003 HC RX 258: Performed by: SPECIALIST

## 2018-07-06 RX ORDER — WARFARIN SODIUM 2 MG/1
2 TABLET ORAL
Status: COMPLETED | OUTPATIENT
Start: 2018-07-06 | End: 2018-07-06

## 2018-07-06 RX ADMIN — Medication 1 TABLET: at 20:43

## 2018-07-06 RX ADMIN — MIRTAZAPINE 15 MG: 15 TABLET, FILM COATED ORAL at 20:43

## 2018-07-06 RX ADMIN — FOLIC ACID 1 MG: 1 TABLET ORAL at 09:24

## 2018-07-06 RX ADMIN — SIMVASTATIN 40 MG: 40 TABLET, FILM COATED ORAL at 20:43

## 2018-07-06 RX ADMIN — INSULIN LISPRO 1 UNITS: 100 INJECTION, SOLUTION INTRAVENOUS; SUBCUTANEOUS at 13:08

## 2018-07-06 RX ADMIN — WARFARIN SODIUM 2 MG: 2 TABLET ORAL at 18:57

## 2018-07-06 RX ADMIN — Medication 10 ML: at 20:44

## 2018-07-06 RX ADMIN — INSULIN GLARGINE 23 UNITS: 100 INJECTION, SOLUTION SUBCUTANEOUS at 09:10

## 2018-07-06 RX ADMIN — LISINOPRIL 10 MG: 10 TABLET ORAL at 09:24

## 2018-07-06 RX ADMIN — Medication 1 TABLET: at 09:25

## 2018-07-06 RX ADMIN — SODIUM CHLORIDE 1000 MG: 900 INJECTION INTRAVENOUS at 12:55

## 2018-07-06 RX ADMIN — INSULIN LISPRO 1 UNITS: 100 INJECTION, SOLUTION INTRAVENOUS; SUBCUTANEOUS at 20:43

## 2018-07-06 RX ADMIN — INSULIN LISPRO 1 UNITS: 100 INJECTION, SOLUTION INTRAVENOUS; SUBCUTANEOUS at 18:07

## 2018-07-06 RX ADMIN — SOTALOL HYDROCHLORIDE 120 MG: 120 TABLET ORAL at 09:25

## 2018-07-06 RX ADMIN — Medication 10 ML: at 09:25

## 2018-07-06 RX ADMIN — MEROPENEM 1 G: 1 INJECTION, POWDER, FOR SOLUTION INTRAVENOUS at 06:13

## 2018-07-06 RX ADMIN — INSULIN LISPRO 1 UNITS: 100 INJECTION, SOLUTION INTRAVENOUS; SUBCUTANEOUS at 09:09

## 2018-07-06 ASSESSMENT — PAIN SCALES - GENERAL
PAINLEVEL_OUTOF10: 0
PAINLEVEL_OUTOF10: 0

## 2018-07-06 NOTE — PLAN OF CARE
Problem: Falls - Risk of:  Goal: Will remain free from falls  Will remain free from falls   Outcome: Ongoing      Problem: Risk for Impaired Skin Integrity  Goal: Tissue integrity - skin and mucous membranes  Structural intactness and normal physiological function of skin and  mucous membranes.    Outcome: Met This Shift

## 2018-07-06 NOTE — CONSULTS
7/6/2018 8:36 AM  Service: Urology  Group: [facility] ([facility])    Missy Ahumada  [id number]     Chief Complaint:    Urinary tract infection    History of Present Illness: The patient is a 80 y.o. male patient who presents with urinary tract infection. this patient is known to us. He has had recurrent nephrolithiasis and bladder outlet obstruction. He had TURP on 11/18/2014. On 8/24/2017 he had a cystoscopy removal of a left stent flexible ureteroscopy and nature corporeal holmium laser lithotripsy and reinsertion of the stent. That stent was eventually removed. 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 His postvoid residual in the emergency room at that time was 1200 mL. The patient presented to the emergency room yesterday with the difficulty ambulate ambulating and he's been having fairly recurrent urinary tract infections which have made him week. He had been prescribed Macrodantin from our office and had been on 4 courses of this since May of this year. He apparently still gets occasional chills and fever sensation is temperature is not documented. His weakness lead him to fall today and he was subsequently admitted. Apparently he sustained a possible fracture of the left maxilla.  Apparently he has a 7 x 3 mm foreign body in the subcutaneous tissues overlying the left zygoma which  apparently is an old finding  The patient's wife who is the historian with the patient said that he had been voiding well with nocturia ×2-3 he had developed some lower abdominal pain on July 4 and then by evening had fever became weak fell and thus had the facial injury  She's not sure if he had been completely emptying his bladder      Past Medical History:   Diagnosis Date    Asymptomatic bilateral 95%   BMI 29.13 kg/m²     General:  Awake, alert, oriented X 3. Well developed, well nourished, well groomed. No apparent distress. HEENT:  Normocephalic, atraumatic. Pupils equal, round. No scleral icterus. No conjunctival injection. Normal lips, teeth, and gums. No nasal discharge. Neck:  Supple, no masses. Heart:  RRR  Lungs:  No audible wheezing. Respirations symmetric and non-labored. Abdomen:  soft, nontender, no masses, no organomegaly, no peritoneal signs  Extremities:  No clubbing, cyanosis, or edema  Skin:  Warm and dry, no open lesions or rashes  Neuro:  There are no motor or sensory deficits in the 4 quadrant extremities   Rectal: deferred  Genitalia:  No scrotal edema and no acute epididymoorchitis or penile masses    Labs:     Recent Labs      07/05/18   1037  07/06/18   0340   WBC  12.9*  12.4*   RBC  3.82  4.02   HGB  11.6*  12.3*   HCT  35.8*  37.6   MCV  93.7  93.5   MCH  30.4  30.6   MCHC  32.4  32.7   RDW  13.9  13.9   PLT  155  148   MPV  10.7  9.8         Recent Labs      07/05/18   1112  07/06/18   0340   CREATININE  1.1  0.9         Assessment:  Oralia Andrew 80 y.o. male     The patient is to be assessed for recurrent retention  I did discuss the case with Dr. Nancy Pace of infectious disease imaging will be done to be sure there is no recurrence of emphysematous pyelonephritis or cystitis    Plan:    Repeat imaging and bladder scan will be done today  He may need a [place] catheter    Electronically signed by Boris Hamlin MD on 7/6/2018 at 8:36 AM

## 2018-07-06 NOTE — CARE COORDINATION
Social Work / discharge planning    7/6/2018 10:15 AM      Met with wife and daughter Harrison Allen (who is DON @ [address]). Lives with wife in mobile home. Ambulates with wheeled walker. ,   See Dr Debra Jovel thru [facility] and gets medications thru the [facility]. Patient has been to [facility] in past and if rehab is needed would like to go back there. Daughter indicates if patient is able to walk 15-20 feet than is can return home, if not than he will go to rehab  Will await therapy collin. N-17 generated.    Referral made to [place] with [facility]     Electronically signed by BETTYE East on 7/6/2018 at 10:31 AM

## 2018-07-06 NOTE — PROGRESS NOTES
Pharmacy Consultation Note  (Anticoagulant Dosing and Monitoring)    Initial consult date: 7/5  Consulting physician: Rajat    Allergies:  Bacitracin and Percocet [oxycodone-acetaminophen]    80 y.o. male      Ht Readings from Last 1 Encounters:   07/05/18 5' 7\" (1.702 m)     Wt Readings from Last 1 Encounters:   07/06/18 186 lb (84.4 kg)         Warfarin Indication Target   INR Range [place]   Dose  (if applicable) Diet/Feeding Tube   Atrial Fibrillation 2-3 1 mg on Tuesday, Thursday, and Saturday  2 mg on all other days        Vitamin K or Blood product  Administration Date                 Warfarin drug-drug interactions  Start  Stop Home Med? Comments                                 TSH:    Lab Results   Component Value Date    TSH 2.060 08/27/2017        Hepatic Function Panel:                            Lab Results   Component Value Date    ALKPHOS 86 07/06/2018    ALT 19 07/06/2018    AST 25 07/06/2018    PROT 6.4 07/06/2018    BILITOT 0.9 07/06/2018    BILIDIR <0.2 06/16/2014    IBILI 0.2 06/16/2014    LABALBU 3.4 07/06/2018       Date Warfarin Dose INR Heparin or LMWH HBG/  HCT PLT Comment   7/5 HOLD 3.2 x 11.6/35.8 155    7/6 2 mg 2.3 x 12.3/37.6 148                                 Assessment and Plan:  · Silver Xiong is a patient of the [id number] [facility] clinic. Patient's home warfarin dose is 1 mg on Tues, Thur, Sat and 2 mg all other days. Last INR at [facility] was 2.4 on 6/15/18. · INR is therapeutic today at 2.3, goal 2-3 for stroke prevention for atrial fibrillation. INR was 3.2 yesterday and warfarin held. · Resume home warfarin dosing regimen today. · Daily PT/INR until the INR is stable within the therapeutic range  · Next scheduled [facility] appointment is 7/13/18.   · Pharmacist will follow and monitor/adjust dosing as necessary    Ramonita Hodgkin, PharmD 7/6/2018 8:52 AM

## 2018-07-06 NOTE — PROGRESS NOTES
3.8   CL  96*  100   CO2  29  29   BUN  19  14   CREATININE  1.1  0.9   GLUCOSE  255*  141*   CALCIUM  8.2*  8.8       Recent Labs      07/05/18   1037  07/06/18   0340   WBC  12.9*  12.4*   RBC  3.82  4.02   HGB  11.6*  12.3*   HCT  35.8*  37.6   MCV  93.7  93.5   MCH  30.4  30.6   MCHC  32.4  32.7   RDW  13.9  13.9   PLT  155  148   MPV  10.7  9.8       CBC with Differential:    Lab Results   Component Value Date    WBC 12.4 07/06/2018    RBC 4.02 07/06/2018    HGB 12.3 07/06/2018    HCT 37.6 07/06/2018     07/06/2018    MCV 93.5 07/06/2018    MCH 30.6 07/06/2018    MCHC 32.7 07/06/2018    RDW 13.9 07/06/2018    LYMPHOPCT 5.7 07/05/2018    MONOPCT 4.8 07/05/2018    BASOPCT 0.3 07/05/2018    MONOSABS 0.62 07/05/2018    LYMPHSABS 0.73 07/05/2018    EOSABS 0.06 07/05/2018    BASOSABS 0.04 07/05/2018     Hepatic Function Panel:    Lab Results   Component Value Date    ALKPHOS 86 07/06/2018    ALT 19 07/06/2018    AST 25 07/06/2018    PROT 6.4 07/06/2018    BILITOT 0.9 07/06/2018    BILIDIR <0.2 06/16/2014    IBILI 0.2 06/16/2014    LABALBU 3.4 07/06/2018     Warfarin PT/INR:  No components found for: Wei Hem  Last 3 Troponin:    Lab Results   Component Value Date    TROPONINI <0.01 05/01/2018    TROPONINI <0.01 02/05/2018    TROPONINI <0.01 06/09/2016     FLP:    Lab Results   Component Value Date    TRIG 106 08/31/2017    HDL 33 08/31/2017    LDLCALC 66 08/31/2017    LABVLDL 21 08/31/2017     TSH:    Lab Results   Component Value Date    TSH 2.060 08/27/2017        Radiology:   CT FACIAL BONES WO CONTRAST   Final Result   1. Study limited by suboptimal postprocessing. Reprocessed images have   been requested. 2. No obvious fracture. 3. 7-mm x 3-mm foreign body in the subcutaneous tissue overlying the   left zygoma, possibly a small piece of metal.      4. Mild maxillary sinus mucosal thickening, left greater than right.       CT Head WO Contrast   Final Result   ALERT THIS IS AN ABNORMAL REPORT. Findings communicated   directly with Dr. Axel Becerra at approximately 7/5/2018 at 1137 hours. 1. There is a possible fracture of the left maxilla versus motion   artifact. CT facial bones without contrast is recommended. 2. Moderate cerebral volume loss/atrophy. 3. Moderate white matter changes consistent with sequelae small vessel   ischemic disease. 4. Vascular calcifications within the bilateral internal carotid   artery cavernous segments and the vertebral arteries. .   5. Small amount layering fluid in the bilateral maxillary sinuses. CT ABDOMEN PELVIS WO CONTRAST    (Results Pending)       Assessment:    Principal Problem:    Acute metabolic encephalopathy  Active Problems:    DM2 (diabetes mellitus, type 2) ([facility])    Essential hypertension    Hyperlipidemia    General weakness    UTI (urinary tract infection)    Acute cystitis with hematuria    History of atrial fibrillation  Resolved Problems:    Urinary retention      Plan:  1. Acute metabolic encephalopathy likely related to UTI: Continue antibiotics  2. Acute cystitis: E. coli growing in urine. Full spectrum of sensitivities is pending  3. Hypertension: Blood pressure stable continue current medications  4. Type II diabetes: Continue sliding scale insulin  5. CT of the head reviewed: No evidence of acute fracture  6. Generalized weakness: PT and OT evaluate patient  7.  Urinary retention, recurrent: Place Barajas catheter and plan to discharge patient with a leg bag and follow up with urology after discharge when medically stable    Continue as an inpatient        Electronically signed by Enrique Ba MD on 7/6/2018 at 11:14 AM

## 2018-07-07 LAB
INR BLD: 1.7
METER GLUCOSE: 116 MG/DL (ref 70–110)
METER GLUCOSE: 214 MG/DL (ref 70–110)
METER GLUCOSE: 335 MG/DL (ref 70–110)
METER GLUCOSE: 354 MG/DL (ref 70–110)
PROTHROMBIN TIME: 18.9 SEC (ref 9.3–12.4)

## 2018-07-07 PROCEDURE — 2580000003 HC RX 258: Performed by: SPECIALIST

## 2018-07-07 PROCEDURE — 99232 SBSQ HOSP IP/OBS MODERATE 35: CPT | Performed by: INTERNAL MEDICINE

## 2018-07-07 PROCEDURE — 36415 COLL VENOUS BLD VENIPUNCTURE: CPT

## 2018-07-07 PROCEDURE — 1200000000 HC SEMI PRIVATE

## 2018-07-07 PROCEDURE — 82962 GLUCOSE BLOOD TEST: CPT

## 2018-07-07 PROCEDURE — 6370000000 HC RX 637 (ALT 250 FOR IP): Performed by: NURSE PRACTITIONER

## 2018-07-07 PROCEDURE — 6370000000 HC RX 637 (ALT 250 FOR IP): Performed by: INTERNAL MEDICINE

## 2018-07-07 PROCEDURE — 85610 PROTHROMBIN TIME: CPT

## 2018-07-07 PROCEDURE — 6360000002 HC RX W HCPCS: Performed by: SPECIALIST

## 2018-07-07 PROCEDURE — 87040 BLOOD CULTURE FOR BACTERIA: CPT

## 2018-07-07 PROCEDURE — 2580000003 HC RX 258: Performed by: NURSE PRACTITIONER

## 2018-07-07 RX ORDER — WARFARIN SODIUM 2 MG/1
2 TABLET ORAL
Status: COMPLETED | OUTPATIENT
Start: 2018-07-07 | End: 2018-07-07

## 2018-07-07 RX ORDER — WARFARIN SODIUM 1 MG/1
1 TABLET ORAL
Status: DISCONTINUED | OUTPATIENT
Start: 2018-07-07 | End: 2018-07-07

## 2018-07-07 RX ORDER — ACETAMINOPHEN 325 MG/1
650 TABLET ORAL EVERY 6 HOURS PRN
Status: DISCONTINUED | OUTPATIENT
Start: 2018-07-07 | End: 2018-07-09 | Stop reason: HOSPADM

## 2018-07-07 RX ADMIN — INSULIN LISPRO 2 UNITS: 100 INJECTION, SOLUTION INTRAVENOUS; SUBCUTANEOUS at 13:10

## 2018-07-07 RX ADMIN — SOTALOL HYDROCHLORIDE 120 MG: 120 TABLET ORAL at 09:47

## 2018-07-07 RX ADMIN — Medication 1 TABLET: at 21:59

## 2018-07-07 RX ADMIN — INSULIN GLARGINE 23 UNITS: 100 INJECTION, SOLUTION SUBCUTANEOUS at 09:47

## 2018-07-07 RX ADMIN — WARFARIN SODIUM 2 MG: 2 TABLET ORAL at 17:26

## 2018-07-07 RX ADMIN — INSULIN LISPRO 5 UNITS: 100 INJECTION, SOLUTION INTRAVENOUS; SUBCUTANEOUS at 17:24

## 2018-07-07 RX ADMIN — SIMVASTATIN 40 MG: 40 TABLET, FILM COATED ORAL at 21:59

## 2018-07-07 RX ADMIN — INSULIN LISPRO 2 UNITS: 100 INJECTION, SOLUTION INTRAVENOUS; SUBCUTANEOUS at 22:00

## 2018-07-07 RX ADMIN — ACETAMINOPHEN 650 MG: 325 TABLET ORAL at 12:17

## 2018-07-07 RX ADMIN — Medication 1 TABLET: at 09:47

## 2018-07-07 RX ADMIN — Medication 10 ML: at 11:37

## 2018-07-07 RX ADMIN — SODIUM CHLORIDE 1000 MG: 900 INJECTION INTRAVENOUS at 11:37

## 2018-07-07 RX ADMIN — Medication 10 ML: at 22:00

## 2018-07-07 RX ADMIN — MIRTAZAPINE 15 MG: 15 TABLET, FILM COATED ORAL at 21:59

## 2018-07-07 RX ADMIN — FOLIC ACID 1 MG: 1 TABLET ORAL at 09:48

## 2018-07-07 RX ADMIN — Medication 10 ML: at 09:48

## 2018-07-07 RX ADMIN — LISINOPRIL 10 MG: 10 TABLET ORAL at 09:47

## 2018-07-07 ASSESSMENT — PAIN SCALES - GENERAL
PAINLEVEL_OUTOF10: 0
PAINLEVEL_OUTOF10: 0

## 2018-07-07 NOTE — PROGRESS NOTES
Pharmacy Consultation Note  (Anticoagulant Dosing and Monitoring)     Initial consult date: 7/5  Consulting physician: Rajat     Allergies:  Bacitracin and Percocet [oxycodone-acetaminophen]     80 y.o. male            Ht Readings from Last 1 Encounters:   07/05/18 5' 7\" (1.702 m)          Wt Readings from Last 1 Encounters:   07/06/18 186 lb (84.4 kg)            Warfarin Indication Target   INR Range Home   Dose  (if applicable) Diet/Feeding Tube   Atrial Fibrillation 2-3 1 mg on Tuesday, Thursday, and Saturday  2 mg on all other days           Vitamin K or Blood product  Administration Date                      Warfarin drug-drug interactions  Start  Stop Home Med? Comments                                                      TSH:          Lab Results   Component Value Date     TSH 2.060 08/27/2017                     Hepatic Function Panel:                                  Lab Results   Component Value Date     ALKPHOS 86 07/06/2018     ALT 19 07/06/2018     AST 25 07/06/2018     PROT 6.4 07/06/2018     BILITOT 0.9 07/06/2018     BILIDIR <0.2 06/16/2014     IBILI 0.2 06/16/2014     LABALBU 3.4 07/06/2018         Date Warfarin Dose INR Heparin or LMWH HBG/  HCT PLT Comment   7/5 HOLD 3.2 x 11.6/35.8 155     7/6 2 mg 2.3 x 12.3/37.6 148      7/7 2mg  1.7 X                                             Assessment and Plan:  · Ruth Banerjee is a patient of the [address] [facility] clinic. Patient's home warfarin dose is 1 mg on Tues, Thur, Sat and 2 mg all other days. Last INR at [facility] was 2.4 on 6/15/18. · INR is therapeutic today at 2.3, goal 2-3 for stroke prevention for atrial fibrillation. INR was 3.2 yesterday and warfarin held. · 7/7 warfarin 2mg tonight    · Daily PT/INR until the INR is stable within the therapeutic range  · Next scheduled [facility] appointment is 7/13/18.   · Pharmacist will follow and monitor/adjust dosing as necessary    Benton Chew PharmD, BCPS  7/7/2018  11:43 AM

## 2018-07-07 NOTE — PROGRESS NOTES
[address] [place] [address] [facility]  [facility]  Progress Note    SUBJECTIVE:  Chief Complaint   Patient presents with   Paul Blonder     did not hit head, no pain    Extremity Weakness     Patient is tolerating medications. No reported adverse drug reactions. No nausea, vomiting, diarrhea. Review of systems:  As stated above in the chief complaint, otherwise negative. Medications:  Scheduled Meds:   warfarin  2 mg Oral Once    ertapenem (INVANZ) IVPB  1 g Intravenous L56I    folic acid  1 mg Oral QAM    insulin glargine  23 Units Subcutaneous QAM    lisinopril  10 mg Oral QAM    mirtazapine  15 mg Oral Nightly    simvastatin  40 mg Oral Nightly    sotalol  120 mg Oral QAM    sodium chloride flush  10 mL Intravenous 2 times per day    warfarin (COUMADIN) daily dosing (placeholder)   Other RX Placeholder    ocuvite-lutein  1 tablet Oral BID    insulin lispro  0-6 Units Subcutaneous TID WC    insulin lispro  0-3 Units Subcutaneous Nightly     Continuous Infusions:   dextrose       PRN Meds:acetaminophen, sodium chloride flush, magnesium hydroxide, glucose, dextrose, glucagon (rDNA), dextrose    OBJECTIVE:  BP (!) 124/50   Pulse 74   Temp 98.7 °F (37.1 °C) (Oral)   Resp 18   Ht 5' 7\" (1.702 m)   Wt 185 lb 1.6 oz (84 kg)   SpO2 94%   BMI 28.99 kg/m²   Temp  Av.2 °F (37.3 °C)  Min: 98.7 °F (37.1 °C)  Max: 99.6 °F (37.6 °C)  Constitutional: The patient is awake, alert, and oriented. Skin: Warm and dry. No rashes were noted. HEENT: Eyes show round, and reactive pupils. No jaundice. Moist mucous membranes, no ulcerations, no thrush. Neck: Supple to movements. No lymphadenopathy. Chest: No use of accessory muscles to breathe. Symmetrical expansion. Auscultation reveals no wheezing, crackles, or rhonchi. Cardiovascular: S1 and S2 are rhythmic and regular. No murmurs appreciated. Abdomen: Positive bowel sounds to auscultation. Benign to palpation. No masses felt.  No hepatosplenomegaly. Barajas with hematuria   Extremities: No clubbing, no cyanosis, no edema. Lines: peripheral    Laboratory and Tests Review:  Lab Results   Component Value Date    WBC 12.4 (H) 07/06/2018    WBC 12.9 (H) 07/05/2018    WBC 10.9 05/01/2018    HGB 12.3 (L) 07/06/2018    HCT 37.6 07/06/2018    MCV 93.5 07/06/2018     07/06/2018     Lab Results   Component Value Date    NEUTROABS 11.35 (H) 07/05/2018    NEUTROABS 8.58 (H) 05/01/2018    NEUTROABS 5.99 02/09/2018     Lab Results   Component Value Date    CRP 24.4 (H) 02/07/2018    CRP 1.3 (H) 08/29/2017    CRP 1.8 (H) 08/28/2017     No results found for: CRPHS  Lab Results   Component Value Date    SEDRATE 50 (H) 02/07/2018    SEDRATE 9 08/29/2017    SEDRATE 16 (H) 08/28/2017     Lab Results   Component Value Date    ALT 19 07/06/2018    AST 25 07/06/2018    ALKPHOS 86 07/06/2018    BILITOT 0.9 07/06/2018     Lab Results   Component Value Date     07/06/2018    K 3.8 07/06/2018     07/06/2018    CO2 29 07/06/2018    BUN 14 07/06/2018    CREATININE 0.9 07/06/2018    CREATININE 1.1 07/05/2018    CREATININE 0.9 05/01/2018    GFRAA >60 07/06/2018    LABGLOM >60 07/06/2018    GLUCOSE 141 07/06/2018    PROT 6.4 07/06/2018    LABALBU 3.4 07/06/2018    CALCIUM 8.8 07/06/2018    BILITOT 0.9 07/06/2018    ALKPHOS 86 07/06/2018    AST 25 07/06/2018    ALT 19 07/06/2018     Radiology:  Ct abd   CONCLUSION:   1.  Persistent right hydroureteronephrosis, possibly secondary to   impaired function or urinary bladder distention/ureterovesicular   reflux. 2.  Left perinephric fat stranding. Please correlate clinically to   exclude the possibility of urinary tract infection and   pyelonephritis/ureteritis. 3.  Focus of left collecting system air. Please correlate clinically   to determine if there is a significant abnormality requiring further   investigation. 4.  Marked urinary bladder distention.  Barajas catheter placement should   be considered in

## 2018-07-07 NOTE — PROGRESS NOTES
8.8       Lab Results   Component Value Date    HGB 12.3 07/06/2018    HCT 37.6 07/06/2018         Assessment:  Kerrie Laraes 80 y.o. male     Principal Problem:    Acute metabolic encephalopathy  Active Problems:    DM2 (diabetes mellitus, type 2) (HCC)    Essential hypertension    Hyperlipidemia    General weakness    UTI (urinary tract infection)    Acute cystitis with hematuria    History of atrial fibrillation  Resolved Problems:    Urinary retention    Incomplete bladder emptying is the most likely cause of his urinary tract infections  At this point in time long-term drainage is necessary. Intermittent self catheterization or Barajas catheter.  I suspect an indwelling Barajas will be necessary and possibly even suprapubic tube in the future    Plan:  When he is stable and we discharge him with the Barajas catheter see him in a month and rediscuss the options with he and his wife at that time  Urodynamic studies could be done prior to making a decision regarding long-term catheter drainage  Denilson Hernández MD   [facility]  Urology

## 2018-07-07 NOTE — PROGRESS NOTES
no joint swelling, deformity or tenderness  Neurologic: reflexes normal and symmetric, no cranial nerve deficit,  speech normal      Recent Labs      07/05/18   1112  07/06/18   0340   NA  135  139   K  4.1  3.8   CL  96*  100   CO2  29  29   BUN  19  14   CREATININE  1.1  0.9   GLUCOSE  255*  141*   CALCIUM  8.2*  8.8       Recent Labs      07/05/18   1037  07/06/18   0340   WBC  12.9*  12.4*   RBC  3.82  4.02   HGB  11.6*  12.3*   HCT  35.8*  37.6   MCV  93.7  93.5   MCH  30.4  30.6   MCHC  32.4  32.7   RDW  13.9  13.9   PLT  155  148   MPV  10.7  9.8       Labs and images reviewed     Radiology:   CT ABDOMEN PELVIS WO CONTRAST   Final Result      CT FACIAL BONES WO CONTRAST   Final Result   1. Study limited by suboptimal postprocessing. Reprocessed images have   been requested. 2. No obvious fracture. 3. 7-mm x 3-mm foreign body in the subcutaneous tissue overlying the   left zygoma, possibly a small piece of metal.      4. Mild maxillary sinus mucosal thickening, left greater than right. CT Head WO Contrast   Final Result   ALERT THIS IS AN ABNORMAL REPORT. Findings communicated   directly with Dr. Sheeba Lock at approximately 7/5/2018 at 1137 hours. 1. There is a possible fracture of the left maxilla versus motion   artifact. CT facial bones without contrast is recommended. 2. Moderate cerebral volume loss/atrophy. 3. Moderate white matter changes consistent with sequelae small vessel   ischemic disease. 4. Vascular calcifications within the bilateral internal carotid   artery cavernous segments and the vertebral arteries. .   5. Small amount layering fluid in the bilateral maxillary sinuses.           Assessment:    Principal Problem:    Acute metabolic encephalopathy  Active Problems:    DM2 (diabetes mellitus, type 2) ([facility])    Hyperlipidemia    Essential hypertension    General weakness    UTI (urinary tract infection)    Acute cystitis with hematuria    History of atrial

## 2018-07-08 LAB
ANION GAP SERPL CALCULATED.3IONS-SCNC: 10 MMOL/L (ref 7–16)
BUN BLDV-MCNC: 19 MG/DL (ref 8–23)
CALCIUM SERPL-MCNC: 8.3 MG/DL (ref 8.6–10.2)
CHLORIDE BLD-SCNC: 99 MMOL/L (ref 98–107)
CO2: 29 MMOL/L (ref 22–29)
CREAT SERPL-MCNC: 0.8 MG/DL (ref 0.7–1.2)
GFR AFRICAN AMERICAN: >60
GFR NON-AFRICAN AMERICAN: >60 ML/MIN/1.73
GLUCOSE BLD-MCNC: 252 MG/DL (ref 74–109)
HCT VFR BLD CALC: 36.5 % (ref 37–54)
HEMOGLOBIN: 11.6 G/DL (ref 12.5–16.5)
INR BLD: 2.3
MCH RBC QN AUTO: 30 PG (ref 26–35)
MCHC RBC AUTO-ENTMCNC: 31.8 % (ref 32–34.5)
MCV RBC AUTO: 94.3 FL (ref 80–99.9)
METER GLUCOSE: 232 MG/DL (ref 70–110)
METER GLUCOSE: 350 MG/DL (ref 70–110)
METER GLUCOSE: 386 MG/DL (ref 70–110)
METER GLUCOSE: 396 MG/DL (ref 70–110)
ORGANISM: ABNORMAL
PDW BLD-RTO: 13.8 FL (ref 11.5–15)
PLATELET # BLD: 159 E9/L (ref 130–450)
PMV BLD AUTO: 10.1 FL (ref 7–12)
POTASSIUM SERPL-SCNC: 3.9 MMOL/L (ref 3.5–5)
PROTHROMBIN TIME: 26.1 SEC (ref 9.3–12.4)
RBC # BLD: 3.87 E12/L (ref 3.8–5.8)
SODIUM BLD-SCNC: 138 MMOL/L (ref 132–146)
URINE CULTURE, ROUTINE: ABNORMAL
URINE CULTURE, ROUTINE: ABNORMAL
WBC # BLD: 6.3 E9/L (ref 4.5–11.5)

## 2018-07-08 PROCEDURE — 80048 BASIC METABOLIC PNL TOTAL CA: CPT

## 2018-07-08 PROCEDURE — 6370000000 HC RX 637 (ALT 250 FOR IP): Performed by: NURSE PRACTITIONER

## 2018-07-08 PROCEDURE — 82962 GLUCOSE BLOOD TEST: CPT

## 2018-07-08 PROCEDURE — 85610 PROTHROMBIN TIME: CPT

## 2018-07-08 PROCEDURE — 85027 COMPLETE CBC AUTOMATED: CPT

## 2018-07-08 PROCEDURE — 2580000003 HC RX 258: Performed by: NURSE PRACTITIONER

## 2018-07-08 PROCEDURE — 6360000002 HC RX W HCPCS: Performed by: SPECIALIST

## 2018-07-08 PROCEDURE — 1200000000 HC SEMI PRIVATE

## 2018-07-08 PROCEDURE — 2580000003 HC RX 258: Performed by: SPECIALIST

## 2018-07-08 PROCEDURE — 6370000000 HC RX 637 (ALT 250 FOR IP): Performed by: HOSPITALIST

## 2018-07-08 PROCEDURE — 99232 SBSQ HOSP IP/OBS MODERATE 35: CPT | Performed by: INTERNAL MEDICINE

## 2018-07-08 PROCEDURE — 36415 COLL VENOUS BLD VENIPUNCTURE: CPT

## 2018-07-08 RX ORDER — WARFARIN SODIUM 1 MG/1
1 TABLET ORAL
Status: COMPLETED | OUTPATIENT
Start: 2018-07-08 | End: 2018-07-08

## 2018-07-08 RX ADMIN — INSULIN LISPRO 5 UNITS: 100 INJECTION, SOLUTION INTRAVENOUS; SUBCUTANEOUS at 12:24

## 2018-07-08 RX ADMIN — FOLIC ACID 1 MG: 1 TABLET ORAL at 08:52

## 2018-07-08 RX ADMIN — SOTALOL HYDROCHLORIDE 120 MG: 120 TABLET ORAL at 08:52

## 2018-07-08 RX ADMIN — MIRTAZAPINE 15 MG: 15 TABLET, FILM COATED ORAL at 20:37

## 2018-07-08 RX ADMIN — Medication 1 TABLET: at 20:37

## 2018-07-08 RX ADMIN — INSULIN LISPRO 2 UNITS: 100 INJECTION, SOLUTION INTRAVENOUS; SUBCUTANEOUS at 08:53

## 2018-07-08 RX ADMIN — Medication 10 ML: at 09:04

## 2018-07-08 RX ADMIN — SODIUM CHLORIDE 1000 MG: 900 INJECTION INTRAVENOUS at 12:25

## 2018-07-08 RX ADMIN — INSULIN LISPRO 5 UNITS: 100 INJECTION, SOLUTION INTRAVENOUS; SUBCUTANEOUS at 17:27

## 2018-07-08 RX ADMIN — Medication 1 TABLET: at 08:52

## 2018-07-08 RX ADMIN — Medication 10 ML: at 20:37

## 2018-07-08 RX ADMIN — Medication 10 ML: at 12:25

## 2018-07-08 RX ADMIN — WARFARIN SODIUM 1 MG: 1 TABLET ORAL at 17:26

## 2018-07-08 RX ADMIN — SIMVASTATIN 40 MG: 40 TABLET, FILM COATED ORAL at 20:37

## 2018-07-08 RX ADMIN — INSULIN LISPRO 3 UNITS: 100 INJECTION, SOLUTION INTRAVENOUS; SUBCUTANEOUS at 20:37

## 2018-07-08 RX ADMIN — LISINOPRIL 10 MG: 10 TABLET ORAL at 08:52

## 2018-07-08 RX ADMIN — INSULIN GLARGINE 23 UNITS: 100 INJECTION, SOLUTION SUBCUTANEOUS at 08:54

## 2018-07-08 ASSESSMENT — PAIN SCALES - GENERAL
PAINLEVEL_OUTOF10: 0
PAINLEVEL_OUTOF10: 0

## 2018-07-08 NOTE — PROGRESS NOTES
cranial nerve deficit,  speech normal  Recent Labs      07/05/18   1112  07/06/18   0340  07/08/18   0410   NA  135  139  138   K  4.1  3.8  3.9   CL  96*  100  99   CO2  29  29  29   BUN  19  14  19   CREATININE  1.1  0.9  0.8   GLUCOSE  255*  141*  252*   CALCIUM  8.2*  8.8  8.3*       Recent Labs      07/05/18   1037  07/06/18   0340  07/08/18   0410   WBC  12.9*  12.4*  6.3   RBC  3.82  4.02  3.87   HGB  11.6*  12.3*  11.6*   HCT  35.8*  37.6  36.5*   MCV  93.7  93.5  94.3   MCH  30.4  30.6  30.0   MCHC  32.4  32.7  31.8*   RDW  13.9  13.9  13.8   PLT  155  148  159   MPV  10.7  9.8  10.1       Labs and images reviewed     Radiology:   CT ABDOMEN PELVIS WO CONTRAST   Final Result      CT FACIAL BONES WO CONTRAST   Final Result   1. Study limited by suboptimal postprocessing. Reprocessed images have   been requested. 2. No obvious fracture. 3. 7-mm x 3-mm foreign body in the subcutaneous tissue overlying the   left zygoma, possibly a small piece of metal.      4. Mild maxillary sinus mucosal thickening, left greater than right. CT Head WO Contrast   Final Result   ALERT THIS IS AN ABNORMAL REPORT. Findings communicated   directly with Dr. Janet Parks at approximately 7/5/2018 at 1137 hours. 1. There is a possible fracture of the left maxilla versus motion   artifact. CT facial bones without contrast is recommended. 2. Moderate cerebral volume loss/atrophy. 3. Moderate white matter changes consistent with sequelae small vessel   ischemic disease. 4. Vascular calcifications within the bilateral internal carotid   artery cavernous segments and the vertebral arteries. .   5. Small amount layering fluid in the bilateral maxillary sinuses.           Assessment:    Principal Problem:    Acute metabolic encephalopathy  Active Problems:    DM2 (diabetes mellitus, type 2) ([facility] [address]    Hyperlipidemia    Essential hypertension    General weakness    UTI (urinary tract infection)    Acute cystitis with hematuria History of atrial fibrillation  Resolved Problems:    Urinary retention      Plan:  Acute metabolic encephalopathy likely related to UTI: improving ,on abx as per ID.     Complicated UTI sec to ESBL E. Coli- ID following ,on Ertapenem as per ID ,CT reviewed with left Pyelonephritis,rt hydro ,urology & ID  following. Will need to be disch with calzada cath & will follow with urology as out pt.     Hypertension: continue as per home ,monitor.     Type II diabetes:  On ISS     A fib -on sotalol , on coumadin ,monitor INR & adjust dose as needed.     HPL - on statins as per home     Urinary retention, recurrent- Bladder outlet obstruction,rt hydronephrosis - with calzada draining well ,urology following ,to go home with calzada & out pt follow with urology.     Generalized weakness: PT and OT evaluate patient           Electronically signed by Ana Hazel MD on 7/8/2018 at 10:16 AM

## 2018-07-08 NOTE — PROGRESS NOTES
CREATININE  0.8   GLUCOSE  252*   CALCIUM  8.3*       Lab Results   Component Value Date    HGB 11.6 07/08/2018    HCT 36.5 07/08/2018         Assessment:  Deanna Staff Henry 80 y.o. male     Principal Problem:    Acute metabolic encephalopathy  Active Problems:    DM2 (diabetes mellitus, type 2) ([facility])    Essential hypertension    Hyperlipidemia    General weakness    UTI (urinary tract infection)    Acute cystitis with hematuria    History of atrial fibrillation  Resolved Problems:    Urinary retention    Resolved urinary tract infection  Resolving hematuria    Plan:    No cystoscopy at the moment  Can be done as an outpatient in a month  Leave with a Barajas catheter  Will do a cystoscopy on a month and decide whether to reinsert the Barajas or give him a trial of voiding  He can be discharged any time with the Barajas catheter from our standpoint      Niesha May MD   [facility]  Urology

## 2018-07-08 NOTE — PROGRESS NOTES
Pharmacy Consultation Note  (Anticoagulant Dosing and Monitoring)     Initial consult date: 7/5  Consulting physician: Rajat     Allergies:  Bacitracin and Percocet [oxycodone-acetaminophen]     80 y. o. male              Ht Readings from Last 1 Encounters:   07/05/18 5' 7\" (1.702 m)            Wt Readings from Last 1 Encounters:   07/06/18 186 lb (84.4 kg)            Warfarin Indication Target   INR Range Home   Dose  (if applicable) Diet/Feeding Tube   Atrial Fibrillation 2-3 1 mg on Tuesday, Thursday, and Saturday  2 mg on all other days           Vitamin K or Blood product  Administration Date                      Warfarin drug-drug interactions  Start  Stop Home Med? Comments                                                      TSH:              Lab Results   Component Value Date     TSH 2.060 08/27/2017                     Hepatic Function Panel:                           Lab Results   Component Value Date     ALKPHOS 86 07/06/2018     ALT 19 07/06/2018     AST 25 07/06/2018     PROT 6.4 07/06/2018     BILITOT 0.9 07/06/2018     BILIDIR <0.2 06/16/2014     IBILI 0.2 06/16/2014     LABALBU 3.4 07/06/2018         Date Warfarin Dose INR Heparin or LMWH HBG/  HCT PLT Comment   7/5 HOLD 3.2 x 11.6/35.8 155     7/6 2 mg 2.3 x 12.3/37.6 148      7/7 2mg  1.7 X          7/8 1mg   2.3  X 11.6/36.5   159                        Assessment and Plan:  · Al[facility]. Patient's home warfarin dose is 1 mg on Tues, Thur, Sat and 2 mg all other days.  Last INR at [facility] was 2.4 on 6/15/18. · 7/8, INR 2.3 warfarin 1mg tonight    · Daily PT/INR until the INR is stable within the therapeutic range  · Next scheduled [facility] appointment is 7/13/18.   · Pharmacist will follow and monitor/adjust dosing as necessary     Maryla Leyden, PharmD, BCPS  7/8/2018  8:54 AM

## 2018-07-08 NOTE — PROGRESS NOTES
mcg/mL   nitrofurantoin Intermediate =^64 mcg/mL   piperacillin-tazobactam Sensitive =^8 mcg/mL   tobramycin Resistant >=^16 mcg/mL   trimethoprim-sulfamethoxazole Resistant >=^320 mcg/mL          Assessment:  · Complicated UTI with sepsis, probably secondary to ESBL + E. Coli  · L, pyelonephritis   · Fever with leukocytosis secondary to complicated UTI--improved   · Probable urinary retention and bladder outlet obstruction, calzada cath now     Plan:    ·  Ertapenem day 3  · CT of the abdomen and pelvis  · Check cultures urine Ecoli  · [facility] sent  · Monitor labs  · Will follow with you    Jackie River  11:18 AM  7/8/2018   Pt seen and examined. Above discussed agree with advanced practice nurse. Labs, cultures, and radiographs reviewed. Face to Face encounter occurred. Changes made as necessary.      Meri Small MD

## 2018-07-09 VITALS
BODY MASS INDEX: 29.19 KG/M2 | HEIGHT: 67 IN | SYSTOLIC BLOOD PRESSURE: 172 MMHG | TEMPERATURE: 98.1 F | OXYGEN SATURATION: 94 % | WEIGHT: 186 LBS | HEART RATE: 70 BPM | RESPIRATION RATE: 16 BRPM | DIASTOLIC BLOOD PRESSURE: 70 MMHG

## 2018-07-09 LAB
ANION GAP SERPL CALCULATED.3IONS-SCNC: 9 MMOL/L (ref 7–16)
BUN BLDV-MCNC: 17 MG/DL (ref 8–23)
CALCIUM SERPL-MCNC: 8.2 MG/DL (ref 8.6–10.2)
CHLORIDE BLD-SCNC: 99 MMOL/L (ref 98–107)
CO2: 31 MMOL/L (ref 22–29)
CREAT SERPL-MCNC: 0.6 MG/DL (ref 0.7–1.2)
GFR AFRICAN AMERICAN: >60
GFR NON-AFRICAN AMERICAN: >60 ML/MIN/1.73
GLUCOSE BLD-MCNC: 321 MG/DL (ref 74–109)
INR BLD: 3.2
METER GLUCOSE: 196 MG/DL (ref 70–110)
METER GLUCOSE: 305 MG/DL (ref 70–110)
POTASSIUM SERPL-SCNC: 4.4 MMOL/L (ref 3.5–5)
PROTHROMBIN TIME: 35.1 SEC (ref 9.3–12.4)
SODIUM BLD-SCNC: 139 MMOL/L (ref 132–146)

## 2018-07-09 PROCEDURE — 05HC33Z INSERTION OF INFUSION DEVICE INTO LEFT BASILIC VEIN, PERCUTANEOUS APPROACH: ICD-10-PCS | Performed by: INTERNAL MEDICINE

## 2018-07-09 PROCEDURE — 36569 INSJ PICC 5 YR+ W/O IMAGING: CPT

## 2018-07-09 PROCEDURE — 80048 BASIC METABOLIC PNL TOTAL CA: CPT

## 2018-07-09 PROCEDURE — 97110 THERAPEUTIC EXERCISES: CPT

## 2018-07-09 PROCEDURE — 6360000002 HC RX W HCPCS: Performed by: SPECIALIST

## 2018-07-09 PROCEDURE — 82962 GLUCOSE BLOOD TEST: CPT

## 2018-07-09 PROCEDURE — C1751 CATH, INF, PER/CENT/MIDLINE: HCPCS

## 2018-07-09 PROCEDURE — 99239 HOSP IP/OBS DSCHRG MGMT >30: CPT | Performed by: INTERNAL MEDICINE

## 2018-07-09 PROCEDURE — 2580000003 HC RX 258: Performed by: NURSE PRACTITIONER

## 2018-07-09 PROCEDURE — 6370000000 HC RX 637 (ALT 250 FOR IP): Performed by: NURSE PRACTITIONER

## 2018-07-09 PROCEDURE — 2580000003 HC RX 258: Performed by: SPECIALIST

## 2018-07-09 PROCEDURE — 76937 US GUIDE VASCULAR ACCESS: CPT

## 2018-07-09 PROCEDURE — 2500000003 HC RX 250 WO HCPCS: Performed by: SPECIALIST

## 2018-07-09 PROCEDURE — 36415 COLL VENOUS BLD VENIPUNCTURE: CPT

## 2018-07-09 PROCEDURE — 85610 PROTHROMBIN TIME: CPT

## 2018-07-09 RX ORDER — SODIUM CHLORIDE 0.9 % (FLUSH) 0.9 %
10 SYRINGE (ML) INJECTION PRN
Status: DISCONTINUED | OUTPATIENT
Start: 2018-07-09 | End: 2018-07-09 | Stop reason: HOSPADM

## 2018-07-09 RX ORDER — HEPARIN SODIUM (PORCINE) LOCK FLUSH IV SOLN 100 UNIT/ML 100 UNIT/ML
100 SOLUTION INTRAVENOUS EVERY 12 HOURS
Status: DISCONTINUED | OUTPATIENT
Start: 2018-07-09 | End: 2018-07-09 | Stop reason: HOSPADM

## 2018-07-09 RX ORDER — WARFARIN SODIUM 1 MG/1
1 TABLET ORAL
Status: DISCONTINUED | OUTPATIENT
Start: 2018-07-09 | End: 2018-07-09

## 2018-07-09 RX ORDER — LIDOCAINE HYDROCHLORIDE 10 MG/ML
5 INJECTION, SOLUTION INFILTRATION; PERINEURAL ONCE
Status: COMPLETED | OUTPATIENT
Start: 2018-07-09 | End: 2018-07-09

## 2018-07-09 RX ORDER — WARFARIN SODIUM 1 MG/1
TABLET ORAL
Qty: 30 TABLET | Refills: 0
Start: 2018-07-09 | End: 2018-08-17

## 2018-07-09 RX ORDER — WARFARIN SODIUM 1 MG/1
1 TABLET ORAL DAILY
Status: DISCONTINUED | OUTPATIENT
Start: 2018-07-10 | End: 2018-07-09 | Stop reason: HOSPADM

## 2018-07-09 RX ORDER — SODIUM CHLORIDE 0.9 % (FLUSH) 0.9 %
10 SYRINGE (ML) INJECTION EVERY 12 HOURS SCHEDULED
Status: DISCONTINUED | OUTPATIENT
Start: 2018-07-09 | End: 2018-07-09 | Stop reason: HOSPADM

## 2018-07-09 RX ORDER — HEPARIN SODIUM (PORCINE) LOCK FLUSH IV SOLN 100 UNIT/ML 100 UNIT/ML
100 SOLUTION INTRAVENOUS PRN
Status: DISCONTINUED | OUTPATIENT
Start: 2018-07-09 | End: 2018-07-09 | Stop reason: HOSPADM

## 2018-07-09 RX ADMIN — SOTALOL HYDROCHLORIDE 120 MG: 120 TABLET ORAL at 08:01

## 2018-07-09 RX ADMIN — Medication 1 TABLET: at 08:01

## 2018-07-09 RX ADMIN — INSULIN LISPRO 1 UNITS: 100 INJECTION, SOLUTION INTRAVENOUS; SUBCUTANEOUS at 08:03

## 2018-07-09 RX ADMIN — Medication 10 ML: at 08:01

## 2018-07-09 RX ADMIN — Medication 10 ML: at 09:56

## 2018-07-09 RX ADMIN — LISINOPRIL 10 MG: 10 TABLET ORAL at 08:01

## 2018-07-09 RX ADMIN — SODIUM CHLORIDE 1000 MG: 900 INJECTION INTRAVENOUS at 11:53

## 2018-07-09 RX ADMIN — LIDOCAINE HYDROCHLORIDE 5 ML: 10 INJECTION, SOLUTION EPIDURAL; INFILTRATION; INTRACAUDAL; PERINEURAL at 14:20

## 2018-07-09 RX ADMIN — FOLIC ACID 1 MG: 1 TABLET ORAL at 08:01

## 2018-07-09 RX ADMIN — INSULIN GLARGINE 23 UNITS: 100 INJECTION, SOLUTION SUBCUTANEOUS at 08:02

## 2018-07-09 RX ADMIN — INSULIN LISPRO 4 UNITS: 100 INJECTION, SOLUTION INTRAVENOUS; SUBCUTANEOUS at 11:54

## 2018-07-09 ASSESSMENT — PAIN SCALES - GENERAL
PAINLEVEL_OUTOF10: 0
PAINLEVEL_OUTOF10: 0

## 2018-07-09 NOTE — PROGRESS NOTES
Nurse to nurse report called to [facility] @ [address] [facility] (Krishan Garcia).

## 2018-07-09 NOTE — PROGRESS NOTES
Occupational Therapy  OT BEDSIDE TREATMENT NOTE      Date:2018  Patient Name: Miguel Kimble  MRN: [id number]  : 1935  Room: [address] [place], [place] [address]     -[facility] Inpatient Daily Activity Raw Score: 15/24     Diagnosis: UTI     Precautions: falls, Deaf L ear     Home Living: Pt lives with his wife in a mobile home with ramped entrance; bed/bath on main  Bathroom setup: raised toilet seat with arm rests, shower seat  Equipment owned: transport w/c, FWW, commode chair with arm rests, shower chair     Prior Level of Function: I with ADLs; I with IADLs. MI for ambulation with FWW. Driving: yes  Occupation: retired     Pain Level: pt c/o no pain this session      Cognition: alert, motivated to participate with theraoy          UE AROM WFLS for ADL activity      Functional Assessment:    Initial Status 18 Tx Session 18   Feeding  I  Supervision/set-up    Assist cutting foods   Sitting up in chair   encouraged patient to feed himself   Wife states she has been doing it since he has been in hospital    Grooming  Min A      Upper Body Dressing Min A       Lower Body Dressing Max A  Max A  Total assist donning socks    Bathing DNT     Toileting  Mod A      Bed Mobility  Supine to Sit: MIn A   Sit to Supine:DNT Min A  Supine to sit (HOB elevated)     Sitting EOB SBA     Functional Transfers Sit to stand Min A   Mod A   Sit - stand from bed     Mod A   SPT   from bed to chair     Functional Mobility Mod A with FWW Encouraged patient to take steps next to bed, wanted to sit in chair to eat lunch. Stated he will try later with physical therapy         Comments: Upon arrival pt lying in bed . At end of session sitting in chair  all lines and tubes intact, call light within reach. Alarm on, wife present in room    · Pt has made fair  progress towards set goals.    · Continue with current plan of care    Time in: 1214  Time out:1230      Nery Luo OTR/L [id number]

## 2018-07-09 NOTE — PROCEDURES
Midline   Catheter insertion date 7/9/2018     Product Number:  [id number]   Lot No: [id number]   Gauge: 4.5 fr   Lumen: single   L Basilic    Vein Diameter : 0.4 cm   Upper arm circumference: 34 cm at 10 cm above the [facility]   Catheter Length : 15 cm   Internal Length: 15 cm   Exposed Catheter Length: 0   Ultrasound Used:yes    : Aiyana Zhao RN

## 2018-07-09 NOTE — PROGRESS NOTES
Alex Pardo is a 80 y.o. male patient.     Current Facility-Administered Medications   Medication Dose Route Frequency Provider Last Rate Last Dose    acetaminophen (TYLENOL) tablet 650 mg  650 mg Oral Q6H PRN Khadra Reynaga MD   650 mg at 07/07/18 1217    ertapenem (INVANZ) 1 g IVPB minibag  1 g Intravenous Q24H Rodolfo Elizondo MD   Stopped at 35/97/64 1873    folic acid (FOLVITE) tablet 1 mg  1 mg Oral QAM April LOPEZ EarlN - CNP   1 mg at 07/08/18 0288    insulin glargine (LANTUS) injection vial 23 Units  23 Units Subcutaneous QAM April MIGEL Earl - CNP   23 Units at 07/08/18 0854    lisinopril (PRINIVIL;ZESTRIL) tablet 10 mg  10 mg Oral QAM April LOPEZ EarlN - CNP   10 mg at 07/08/18 7727    mirtazapine (REMERON) tablet 15 mg  15 mg Oral Nightly April LOPEZ EarlN - CNP   15 mg at 07/08/18 2037    simvastatin (ZOCOR) tablet 40 mg  40 mg Oral Nightly April Rajat, APRN - CNP   40 mg at 07/08/18 2037    sotalol (BETAPACE) tablet 120 mg  120 mg Oral QAM April Rajat, APRN - CNP   120 mg at 07/08/18 9571    sodium chloride flush 0.9 % injection 10 mL  10 mL Intravenous 2 times per day April MIGEL Earl - CNP   10 mL at 07/08/18 2037    sodium chloride flush 0.9 % injection 10 mL  10 mL Intravenous PRN April LOPEZ EarlN - CNP   10 mL at 07/08/18 1225    magnesium hydroxide (MILK OF MAGNESIA) 400 MG/5ML suspension 30 mL  30 mL Oral Daily PRN April MIGEL Earl - CNP        warfarin (COUMADIN) daily dosing (placeholder)   Other RX Placeholder April MIGEL Earl CNP        antioxidant multivitamin (OCUVITE) tablet  1 tablet Oral BID April MIGEL Earl CNP   1 tablet at 07/08/18 2037    insulin lispro (HUMALOG) injection vial 0-6 Units  0-6 Units Subcutaneous TID [facility] April Regi-Silas, APRN - CNP   5 Units at 07/08/18 1727    insulin lispro (HUMALOG) injection vial 0-3 Units

## 2018-07-09 NOTE — DISCHARGE SUMMARY
[facility] Hospitalist Physician Discharge Summary     Follow-up With  Details  Why  Contact Info   Hong Gtz MD      [address] [address] [phone]   CM [address] [facility],4[address] [facility]      [address] [facility]  [address] [facility] [place] [address]  [phone]   Wally Smith, DO      [address] [facility]  [phone]            Activity level: as tolerated    Diet: Diabetic    Dispo: to SNF       Patient ID:  Ryan Dave  [id number]  80 y.o.  1935    Admit date: 7/5/2018    Discharge date and time:  7/9/2018  1:12 PM    Admission Diagnoses: Principal Problem:    Acute metabolic encephalopathy  Active Problems:    DM2 (diabetes mellitus, type 2) ([address]    Hyperlipidemia    Essential hypertension    General weakness    UTI (urinary tract infection)    Acute cystitis with hematuria    History of atrial fibrillation  Resolved Problems:    Urinary retention      Discharge Diagnoses: Principal Problem:    Acute metabolic encephalopathy  Active Problems:    DM2 (diabetes mellitus, type 2) ([address]    Hyperlipidemia    Essential hypertension    General weakness    UTI (urinary tract infection)    Acute cystitis with hematuria    History of atrial fibrillation  Resolved Problems:    Urinary retention    Needs PT/INR checked x2 /week and adjust coumadin dose as needed. Facility has been notified by nursing.     Consults:  IP CONSULT TO PRIMARY CARE PROVIDER  IP CONSULT TO SOCIAL WORK  IP CONSULT TO PHARMACY  IP CONSULT TO INFECTIOUS DISEASES  IP CONSULT TO UROLOGY  IP CONSULT TO DIETITIAN  IP CONSULT TO PHARMACY  IP CONSULT TO DIETITIAN  IP CONSULT TO IV TEAM  IP CONSULT TO IV TEAM        Hospital Course:    80 y.o. male with a history of CVA, skin cancer of the scalp, macular degeneration, hypertension, hyperlipidemia, [place], nephrolithiasis, diabetes mellitus, carotid artery stenosis, BPH, atrial fibrillation and flutter, and UTI's along with a past surgical history of kidney Temp:  98.1 °F (36.7 °C)  98.1 °F (36.7 °C)   TempSrc:  Oral  Oral   SpO2: 93% 97%  94%   Weight:   186 lb (84.4 kg)    Height:         General Appearance: alert and oriented to person, place and time, frail appearing, in noovert distress  Skin: warm and dry, no rash or erythema  Head: normocephalic and atraumatic  Eyes: pupils equal, round, and reactive to light, extraocular eye movements intact, conjunctivae normal  Neck: supple and non-tender without mass  Pulmonary/Chest: clear to auscultation bilaterally  Cardiovascular: normal rate, regular rhythm, normal S1 and S2  Abdomen: soft, non-tender, non-distended, normal bowel sounds, no masses or organomegaly  Extremities: no cyanosis, clubbing   Musculoskeletal: normal range of motion, no joint swelling, deformity or tenderness  Neurologic: reflexes normal and symmetric, no cranial nerve deficit,  speech normal    LABS:  Recent Labs      18   0410  18   0950   NA  138  139   K  3.9  4.4   CL  99  99   CO2  29  31*   BUN  19  17   CREATININE  0.8  0.6*   GLUCOSE  252*  321*   CALCIUM  8.3*  8.2*       Recent Labs      18   0410   WBC  6.3   RBC  3.87   HGB  11.6*   HCT  36.5*   MCV  94.3   MCH  30.0   MCHC  31.8*   RDW  13.8   PLT  159   MPV  10.1       Imaging:  Ct Abdomen Pelvis Wo Contrast    Result Date: 2018  Patient MRN:  [id number] : 1935 Age: 80 years Gender: Male Order Date:  2018 10:30 AM EXAM: CT ABDOMEN PELVIS WO CONTRAST NUMBER OF IMAGES:  [id number] INDICATION:  HYDRONEPHROSIS  COMPARISON: CT scans of the abdomen and pelvis dated 2017 and 2018 TECHNIQUE: CT scans of the abdomen and pelvis were obtained without intravenous contrast media. Sagittal and coronal reformatted images were obtained. Low-dose CT  acquisition technique included one of following options; 1 . Automated exposure control, 2. Adjustment of MA and or KV according to patient's size or 3. Use of iterative reconstruction.  FINDINGS: The visualized lung bases show dependent atelectatic changes and calcified pulmonary granulomas. Calcific atherosclerotic disease of the thoracic aorta and coronary arteries seen. A 7.8 x 10.5 x 9.4 cm (AP by transverse by craniocaudal) inhomogeneous soft tissue density mass seen arising from the left adrenal region. This lesion demonstrates a tiny interval increase in size when compared to prior examinations dating back to May 2017. Cholelithiasis and calcified splenic granulomas again noted. The liver and pancreas are grossly unremarkable. Nonspecific perinephric fat stranding seen, much greater on the left. There is moderate right hydronephrosis down to the level of the urinary bladder, similar to the patient's prior examinations. A nonobstructing 5 x 7 mm(transverse by craniocaudal) distal left ureteral calculus seen. A punctate, 3 mm, left interpolar region renal calculus also noted. A focus of left collecting system air also noted. The retroperitoneum shows atherosclerotic calcification in the abdominal aorta without aneurysm. The bowel is normal in caliber. No definite foci of small or large bowel wall thickening are seen. Colonic diverticular disease seen without evidence of acute diverticulitis. There is partial visualization of a normal-appearing appendix. The urinary bladder is markedly distended. No free fluid is noted within the pelvis. Advanced degenerative changes of the visualized lower thoracic and lumbar spine again seen. Stable indeterminate sclerotic foci are seen within the right ischium and fifth lumbar vertebral body. CONCLUSION: 1. Persistent right hydroureteronephrosis, possibly secondary to impaired function or urinary bladder distention/ureterovesicular reflux. 2.  Left perinephric fat stranding. Please correlate clinically to exclude the possibility of urinary tract infection and pyelonephritis/ureteritis. 3.  Focus of left collecting system air.  Please correlate clinically to determine if there is a significant abnormality requiring further investigation. 4.  Marked urinary bladder distention. [place] catheter placement should be considered in the appropriate clinical context. 5.  Additional findings, as detailed above. Ct Head Wo Contrast    Result Date: 2018  Patient MRN: [id number] : 1935 Age:  80 years Order Date: 2018 10:30 AM Gender: Male Exam: CT HEAD WO CONTRAST Number of Images: [id number] Indication:   Leg weakness, leg weakness while walking, fall Comparison: Head CT 2017 Findings: This examination was performed on a CT scanner with dose reduction. Technique: Low-dose CT  acquisition technique included one of following options; 1 . Automated exposure control, 2. Adjustment of MA and or KV according to patient's size or 3. Use of iterative reconstruction. No intraparenchymal hemorrhage. No extra-axial fluid collection or hematoma. Vascular calcifications within the bilateral internal carotid artery cavernous segments and the vertebral arteries. . Moderate cerebral body loss/atrophy. Hypodensities bilateral centrum semiovale and periventricular regions. Layering fluid noted in the maxillary sinuses. Possible fracture of the left maxilla versus possible motion artifact. . Hypodensities in the bilateral centrum semiovale and periventricular regions. ALERT THIS IS AN ABNORMAL REPORT. Findings communicated directly with Dr. Hima Garza at approximately 2018 at 1137 hours. 1. There is a possible fracture of the left maxilla versus motion artifact. CT facial bones without contrast is recommended. 2. Moderate cerebral volume loss/atrophy. 3. Moderate white matter changes consistent with sequelae small vessel ischemic disease. 4. Vascular calcifications within the bilateral internal carotid artery cavernous segments and the vertebral arteries. . 5. Small amount layering fluid in the bilateral maxillary sinuses.     Ct Facial Bones Wo Contrast    Result Date: 2018  Patient MRN:  [id number] : NOT CHANGED    Details   sotalol (BETAPACE) 80 MG tablet Take 1.5 tablets by mouth daily  Qty: 60 tablet, Refills: 5    Associated Diagnoses: Atrial fibrillation and flutter (HCC)      saxagliptin (ONGLYZA) 2.5 MG TABS tablet Take 5 mg by mouth every morning       insulin glargine (LANTUS SOLOSTAR) 100 UNIT/ML injection pen Inject 23 Units into the skin every morning       lisinopril (PRINIVIL;ZESTRIL) 10 MG tablet Take 10 mg by mouth every morning       mirtazapine (REMERON) 15 MG tablet Take 15 mg by mouth nightly      acetaminophen (TYLENOL) 500 MG tablet Take 1,000 mg by mouth every 6 hours as needed for Pain or Fever       Multiple Vitamins-Minerals (PRESERVISION AREDS 2) CAPS Take 1 tablet by mouth 2 times daily       b complex vitamins capsule Take 1 capsule by mouth every morning       simvastatin (ZOCOR) 40 MG tablet Take 40 mg by mouth nightly.       folic acid (FOLVITE) 1 MG tablet Take 1 mg by mouth every morning       aspirin 81 MG EC tablet Take 81 mg by mouth every morning          STOP taking these medications       nitrofurantoin (MACRODANTIN) 50 MG capsule Comments:   Reason for Stopping:                 Note that  More  than 30 minutes was spent in preparing discharge papers, discussing discharge with patient, medication review, etc.    Signed:  Electronically signed by Alana Hansen MD on 7/9/2018 at 1:12 PM

## 2018-07-12 LAB
BLOOD CULTURE, ROUTINE: NORMAL
CULTURE, BLOOD 2: NORMAL

## 2018-08-04 PROBLEM — N39.0 UTI (URINARY TRACT INFECTION): Status: RESOLVED | Noted: 2018-07-05 | Resolved: 2018-08-04

## 2018-08-07 ENCOUNTER — TELEPHONE (OUTPATIENT)
Dept: NON INVASIVE DIAGNOSTICS | Age: 83
End: 2018-08-07

## 2018-08-07 NOTE — TELEPHONE ENCOUNTER
Spoke with Tayo from [address] [place] [address] . She states Coumadin Clinic is closed today?      Advised increasing coumadin to 2 mg daily   Recheck INR on Friday     She will check with Coumadin Clinic for any further instructions on Friday

## 2018-08-07 NOTE — TELEPHONE ENCOUNTER
----- Message from Cachorro Andrews sent at 8/7/2018  4:01 PM EDT -----  Contact: Chelsey Hardin from [place] [phone]  INR 1.6 1mg tablet 1 time daily    She wants to know if you can call her and give her the dose and instructions.      Pt is an [facility] pt

## 2018-08-10 ENCOUNTER — HOSPITAL ENCOUNTER (OUTPATIENT)
Dept: PHARMACY | Age: 83
Setting detail: THERAPIES SERIES
Discharge: HOME | End: 2018-08-10
Payer: MEDICARE

## 2018-08-10 LAB — INR BLD: 2

## 2018-08-10 NOTE — PROGRESS NOTES
[address] [facility]: HOME CARE NOTE    Patient is currently receiving home care services via: [address] list updated last on: REQUESTED TODAY    INR today is therapeutic at 2, goal 2-3.   INR reflects 2 mg daily since 8/7 (unsure dose prior to as he was in [facility])  Warfarin dosing orders: continue warfarin 2 mg daily    Orders given to: Lyndon Remy @ [phone]  Next INR ordered for:  8/13  Next INR will reflect: 2 mg daily since 8/7    Yisel Marin PharmD 8/10/2018 9:51 AM

## 2018-08-13 ENCOUNTER — HOSPITAL ENCOUNTER (OUTPATIENT)
Dept: PHARMACY | Age: 83
Setting detail: THERAPIES SERIES
Discharge: HOME | End: 2018-08-13
Payer: MEDICARE

## 2018-08-13 LAB — INR BLD: 2.7

## 2018-08-13 NOTE — PROGRESS NOTES
[address] [facility] Clinic: HOME CARE NOTE    Patient is currently receiving home care services via: [address] [facility] list updated last on: WILL CALL OFFICE TO OBTAIN    INR today is therapeutic at 2.7, goal 2-3. INR reflects 2 mg daily since 8/7.   Warfarin dosing orders: 1 mg today then 2 mg daily (will lower dose 7% since INR increased from 2.0 to 2.7 in 3 days and patient was previously on a dose of 1 mg Tues, Thu, Sat; 2 mg all other days prior to recent hospital admission)    Orders given to: Rustam Grady @ [phone]  Next INR ordered for:  8/16  Next INR will reflect: 13 mg per week    Taco Ling PharmD 8/13/2018 2:52 PM

## 2018-08-16 ENCOUNTER — HOSPITAL ENCOUNTER (OUTPATIENT)
Dept: PHARMACY | Age: 83
Setting detail: THERAPIES SERIES
Discharge: HOME | End: 2018-08-16
Payer: MEDICARE

## 2018-08-16 LAB — INR BLD: 3.4

## 2018-08-17 RX ORDER — WARFARIN SODIUM 1 MG/1
1 TABLET ORAL SEE ADMIN INSTRUCTIONS
COMMUNITY
End: 2019-02-14 | Stop reason: SDUPTHER

## 2018-08-20 ENCOUNTER — HOSPITAL ENCOUNTER (OUTPATIENT)
Dept: PHARMACY | Age: 83
Setting detail: THERAPIES SERIES
Discharge: HOME | End: 2018-08-20
Payer: MEDICARE

## 2018-08-20 LAB — INR BLD: 2.3

## 2018-08-22 ENCOUNTER — HOSPITAL ENCOUNTER (OUTPATIENT)
Dept: PHARMACY | Age: 83
Setting detail: THERAPIES SERIES
Discharge: HOME | End: 2018-08-22
Payer: MEDICARE

## 2018-08-22 LAB — INR BLD: 2.8

## 2018-08-22 NOTE — PROGRESS NOTES
[address] [facility]agulation Clinic: HOME CARE NOTE    Patient is currently receiving home care services via: [address]    Med list updated last on: 8/22/18    INR today is therapeutic at 2.8, goal 2-3. INR reflects 2mg daily except 1 mg on Mon/Fri with held dose on 8/16  Warfarin dosing orders: 1 mg Mon/Fri; 2 mg all other days    Orders given to:  Kiet @[phone]  Next INR ordered for:  8/29  Next INR will reflect: 12 mg per week    Claudia Regalado PharmD 8/22/2018 12:47 PM

## 2018-08-29 ENCOUNTER — HOSPITAL ENCOUNTER (OUTPATIENT)
Dept: PHARMACY | Age: 83
Setting detail: THERAPIES SERIES
Discharge: HOME | End: 2018-08-29
Payer: MEDICARE

## 2018-08-29 LAB — INR BLD: 3.5

## 2018-08-29 NOTE — PROGRESS NOTES
[address] [facility]: HOME CARE NOTE    Patient is currently receiving home care services via: [address] list updated last on: 8/29/18    INR today is SUPRAtherapeutic at 3.5, goal 2-3. INR reflects 12 mg per week  Warfarin dosing orders: HOLD TODAY THEN RESUME 1 MG M/F AND 2 MG ALL OTHER DAYS    Orders given to:  Santa @ [phone]  Next INR ordered for:  9/5/18  Next INR will reflect: 10 mg in a week d/t held dose 8/29    Molly Goddard PharmD 8/29/2018 11:43 AM

## 2018-09-05 ENCOUNTER — HOSPITAL ENCOUNTER (OUTPATIENT)
Dept: PHARMACY | Age: 83
Setting detail: THERAPIES SERIES
Discharge: HOME | End: 2018-09-05
Payer: MEDICARE

## 2018-09-05 LAB — INR BLD: 2.9

## 2018-09-12 ENCOUNTER — HOSPITAL ENCOUNTER (OUTPATIENT)
Dept: PHARMACY | Age: 83
Discharge: HOME | End: 2018-09-12

## 2018-09-12 LAB — INR BLD: 2.7

## 2018-09-12 RX ORDER — NITROFURANTOIN 25; 75 MG/1; MG/1
100 CAPSULE ORAL PRN
COMMUNITY

## 2018-09-12 NOTE — PROGRESS NOTES
[facility]: HOME CARE Encounter    Patient is currently receiving home care services via: [address] [facility] list updated last on: 9/12/18; Macrobid 100mg BID x3days; pt has script to take as needed for UTI sx x3days    INR today is therapeutic at 2.7, goal 2-3.   INR reflects: 11mg per week   Warfarin dosing orders: same (1mg MWF, 2mg all other days)    Orders given to: Sheri Beckford @ [phone]  Next INR ordered for:  9/19/18  Next INR will reflect: 11mg per week dosing    Shahnaz Gao PharmD 9/12/2018 11:57 AM

## 2018-09-19 ENCOUNTER — HOSPITAL ENCOUNTER (OUTPATIENT)
Dept: PHARMACY | Age: 83
Discharge: HOME | End: 2018-09-19

## 2018-09-19 LAB — INR BLD: 3.2

## 2018-10-04 ENCOUNTER — HOSPITAL ENCOUNTER (OUTPATIENT)
Dept: PHARMACY | Age: 83
Setting detail: THERAPIES SERIES
Discharge: HOME | End: 2018-10-04
Payer: MEDICARE

## 2018-10-04 VITALS
BODY MASS INDEX: 27.57 KG/M2 | DIASTOLIC BLOOD PRESSURE: 76 MMHG | WEIGHT: 176 LBS | HEART RATE: 58 BPM | SYSTOLIC BLOOD PRESSURE: 125 MMHG

## 2018-10-04 LAB — INTERNATIONAL NORMALIZATION RATIO, POC: 2.2

## 2018-10-04 PROCEDURE — 99211 OFF/OP EST MAY X REQ PHY/QHP: CPT

## 2018-10-04 PROCEDURE — 85610 PROTHROMBIN TIME: CPT

## 2018-10-25 ENCOUNTER — HOSPITAL ENCOUNTER (OUTPATIENT)
Dept: PHARMACY | Age: 83
Setting detail: THERAPIES SERIES
Discharge: HOME | End: 2018-10-25
Payer: MEDICARE

## 2018-10-25 VITALS
BODY MASS INDEX: 28.16 KG/M2 | HEART RATE: 60 BPM | DIASTOLIC BLOOD PRESSURE: 85 MMHG | WEIGHT: 179.8 LBS | SYSTOLIC BLOOD PRESSURE: 133 MMHG

## 2018-10-25 DIAGNOSIS — Z86.79 HISTORY OF ATRIAL FIBRILLATION: ICD-10-CM

## 2018-10-25 LAB — INTERNATIONAL NORMALIZATION RATIO, POC: 3.4

## 2018-10-25 PROCEDURE — 99211 OFF/OP EST MAY X REQ PHY/QHP: CPT

## 2018-10-25 PROCEDURE — 85610 PROTHROMBIN TIME: CPT

## 2018-11-12 ENCOUNTER — HOSPITAL ENCOUNTER (OUTPATIENT)
Dept: PHARMACY | Age: 83
Setting detail: THERAPIES SERIES
Discharge: HOME | End: 2018-11-12
Payer: MEDICARE

## 2018-11-12 VITALS
BODY MASS INDEX: 27.85 KG/M2 | DIASTOLIC BLOOD PRESSURE: 68 MMHG | WEIGHT: 177.8 LBS | SYSTOLIC BLOOD PRESSURE: 112 MMHG | HEART RATE: 60 BPM

## 2018-11-12 DIAGNOSIS — Z86.79 HISTORY OF ATRIAL FIBRILLATION: ICD-10-CM

## 2018-11-12 LAB — INTERNATIONAL NORMALIZATION RATIO, POC: 2.5

## 2018-11-12 PROCEDURE — 99211 OFF/OP EST MAY X REQ PHY/QHP: CPT

## 2018-11-12 PROCEDURE — 85610 PROTHROMBIN TIME: CPT

## 2018-12-03 ENCOUNTER — HOSPITAL ENCOUNTER (OUTPATIENT)
Dept: PHARMACY | Age: 83
Setting detail: THERAPIES SERIES
Discharge: HOME | End: 2018-12-03
Payer: MEDICARE

## 2018-12-03 VITALS
WEIGHT: 176.4 LBS | SYSTOLIC BLOOD PRESSURE: 120 MMHG | BODY MASS INDEX: 27.63 KG/M2 | HEART RATE: 60 BPM | DIASTOLIC BLOOD PRESSURE: 79 MMHG

## 2018-12-03 DIAGNOSIS — Z86.79 HISTORY OF ATRIAL FIBRILLATION: ICD-10-CM

## 2018-12-03 LAB — INTERNATIONAL NORMALIZATION RATIO, POC: 2.1

## 2018-12-03 PROCEDURE — 99211 OFF/OP EST MAY X REQ PHY/QHP: CPT

## 2018-12-03 PROCEDURE — 85610 PROTHROMBIN TIME: CPT

## 2018-12-13 ENCOUNTER — OFFICE VISIT (OUTPATIENT)
Dept: NON INVASIVE DIAGNOSTICS | Age: 83
End: 2018-12-13
Payer: MEDICARE

## 2018-12-13 VITALS
WEIGHT: 176 LBS | DIASTOLIC BLOOD PRESSURE: 50 MMHG | BODY MASS INDEX: 27.62 KG/M2 | RESPIRATION RATE: 16 BRPM | HEIGHT: 67 IN | HEART RATE: 61 BPM | SYSTOLIC BLOOD PRESSURE: 124 MMHG

## 2018-12-13 DIAGNOSIS — I48.0 PAROXYSMAL ATRIAL FIBRILLATION (HCC): Primary | ICD-10-CM

## 2018-12-13 DIAGNOSIS — I10 ESSENTIAL HYPERTENSION: ICD-10-CM

## 2018-12-13 DIAGNOSIS — I73.9 PVD (PERIPHERAL VASCULAR DISEASE) (HCC): ICD-10-CM

## 2018-12-13 DIAGNOSIS — Z79.899 LONG-TERM CURRENT USE OF HIGH RISK MEDICATION OTHER THAN ANTICOAGULANT: ICD-10-CM

## 2018-12-13 PROCEDURE — 4040F PNEUMOC VAC/ADMIN/RCVD: CPT | Performed by: INTERNAL MEDICINE

## 2018-12-13 PROCEDURE — 1036F TOBACCO NON-USER: CPT | Performed by: INTERNAL MEDICINE

## 2018-12-13 PROCEDURE — G8484 FLU IMMUNIZE NO ADMIN: HCPCS | Performed by: INTERNAL MEDICINE

## 2018-12-13 PROCEDURE — 99213 OFFICE O/P EST LOW 20 MIN: CPT | Performed by: INTERNAL MEDICINE

## 2018-12-13 PROCEDURE — 93000 ELECTROCARDIOGRAM COMPLETE: CPT | Performed by: INTERNAL MEDICINE

## 2018-12-13 PROCEDURE — G8419 CALC BMI OUT NRM PARAM NOF/U: HCPCS | Performed by: INTERNAL MEDICINE

## 2018-12-13 PROCEDURE — 1101F PT FALLS ASSESS-DOCD LE1/YR: CPT | Performed by: INTERNAL MEDICINE

## 2018-12-13 PROCEDURE — G8427 DOCREV CUR MEDS BY ELIG CLIN: HCPCS | Performed by: INTERNAL MEDICINE

## 2018-12-13 PROCEDURE — 1123F ACP DISCUSS/DSCN MKR DOCD: CPT | Performed by: INTERNAL MEDICINE

## 2018-12-13 PROCEDURE — G8598 ASA/ANTIPLAT THER USED: HCPCS | Performed by: INTERNAL MEDICINE

## 2019-01-01 ENCOUNTER — HOSPITAL ENCOUNTER (OUTPATIENT)
Dept: PHARMACY | Age: 84
Setting detail: THERAPIES SERIES
Discharge: HOME | End: 2019-09-26
Payer: MEDICARE

## 2019-01-01 ENCOUNTER — HOSPITAL ENCOUNTER (OUTPATIENT)
Age: 84
Discharge: HOME | End: 2019-11-14
Payer: MEDICARE

## 2019-01-01 ENCOUNTER — HOSPITAL ENCOUNTER (EMERGENCY)
Age: 84
Discharge: OTHER INSTITUTION | End: 2019-11-14
Attending: EMERGENCY MEDICINE
Payer: MEDICARE

## 2019-01-01 ENCOUNTER — APPOINTMENT (OUTPATIENT)
Dept: GENERAL RADIOLOGY | Age: 84
End: 2019-01-01
Payer: MEDICARE

## 2019-01-01 ENCOUNTER — TELEPHONE (OUTPATIENT)
Dept: ADMINISTRATIVE | Age: 84
End: 2019-01-01

## 2019-01-01 ENCOUNTER — TELEPHONE (OUTPATIENT)
Dept: PHARMACY | Age: 84
End: 2019-01-01

## 2019-01-01 ENCOUNTER — APPOINTMENT (OUTPATIENT)
Dept: GENERAL RADIOLOGY | Age: 84
DRG: 552 | End: 2019-01-01
Attending: INTERNAL MEDICINE
Payer: MEDICARE

## 2019-01-01 ENCOUNTER — APPOINTMENT (OUTPATIENT)
Dept: CT IMAGING | Age: 84
End: 2019-01-01
Payer: MEDICARE

## 2019-01-01 ENCOUNTER — HOSPITAL ENCOUNTER (OUTPATIENT)
Dept: PHARMACY | Age: 84
Setting detail: THERAPIES SERIES
Discharge: HOME | End: 2019-10-24
Payer: MEDICARE

## 2019-01-01 ENCOUNTER — HOSPITAL ENCOUNTER (INPATIENT)
Age: 84
LOS: 6 days | Discharge: NURSING, REHAB OR LONG-TERM CARE | DRG: 552 | End: 2019-11-20
Attending: INTERNAL MEDICINE | Admitting: FAMILY MEDICINE
Payer: MEDICARE

## 2019-01-01 VITALS — SYSTOLIC BLOOD PRESSURE: 123 MMHG | DIASTOLIC BLOOD PRESSURE: 78 MMHG | HEART RATE: 67 BPM

## 2019-01-01 VITALS
HEIGHT: 67 IN | DIASTOLIC BLOOD PRESSURE: 82 MMHG | OXYGEN SATURATION: 95 % | RESPIRATION RATE: 14 BRPM | WEIGHT: 156 LBS | HEART RATE: 75 BPM | SYSTOLIC BLOOD PRESSURE: 172 MMHG | BODY MASS INDEX: 24.48 KG/M2 | TEMPERATURE: 97.7 F

## 2019-01-01 VITALS
SYSTOLIC BLOOD PRESSURE: 129 MMHG | BODY MASS INDEX: 22.96 KG/M2 | WEIGHT: 146.25 LBS | TEMPERATURE: 97.3 F | OXYGEN SATURATION: 97 % | RESPIRATION RATE: 18 BRPM | DIASTOLIC BLOOD PRESSURE: 61 MMHG | HEIGHT: 67 IN | HEART RATE: 71 BPM

## 2019-01-01 VITALS — SYSTOLIC BLOOD PRESSURE: 118 MMHG | HEART RATE: 69 BPM | DIASTOLIC BLOOD PRESSURE: 73 MMHG

## 2019-01-01 DIAGNOSIS — R29.898 WEAKNESS OF BOTH LOWER EXTREMITIES: ICD-10-CM

## 2019-01-01 DIAGNOSIS — S61.411A SKIN TEAR OF RIGHT HAND WITHOUT COMPLICATION, INITIAL ENCOUNTER: ICD-10-CM

## 2019-01-01 DIAGNOSIS — Z86.79 HISTORY OF ATRIAL FIBRILLATION: ICD-10-CM

## 2019-01-01 DIAGNOSIS — S51.011A SKIN TEAR OF RIGHT ELBOW WITHOUT COMPLICATION, INITIAL ENCOUNTER: Primary | ICD-10-CM

## 2019-01-01 LAB
AMMONIA: 15 UMOL/L (ref 16–60)
ANION GAP SERPL CALCULATED.3IONS-SCNC: 11 MMOL/L (ref 7–16)
ANION GAP SERPL CALCULATED.3IONS-SCNC: 12 MMOL/L (ref 7–16)
ANION GAP SERPL CALCULATED.3IONS-SCNC: 15 MMOL/L (ref 7–16)
ANION GAP SERPL CALCULATED.3IONS-SCNC: 16 MMOL/L (ref 7–16)
BACTERIA: ABNORMAL /HPF
BASOPHILS ABSOLUTE: 0.04 E9/L (ref 0–0.2)
BASOPHILS ABSOLUTE: 0.04 E9/L (ref 0–0.2)
BASOPHILS RELATIVE PERCENT: 0.5 % (ref 0–2)
BASOPHILS RELATIVE PERCENT: 0.5 % (ref 0–2)
BILIRUBIN URINE: NEGATIVE
BLOOD, URINE: ABNORMAL
BUN BLDV-MCNC: 18 MG/DL (ref 8–23)
BUN BLDV-MCNC: 18 MG/DL (ref 8–23)
BUN BLDV-MCNC: 19 MG/DL (ref 8–23)
BUN BLDV-MCNC: 21 MG/DL (ref 8–23)
CALCIUM SERPL-MCNC: 9 MG/DL (ref 8.6–10.2)
CALCIUM SERPL-MCNC: 9.1 MG/DL (ref 8.6–10.2)
CALCIUM SERPL-MCNC: 9.3 MG/DL (ref 8.6–10.2)
CALCIUM SERPL-MCNC: 9.5 MG/DL (ref 8.6–10.2)
CHLORIDE BLD-SCNC: 100 MMOL/L (ref 98–107)
CHLORIDE BLD-SCNC: 101 MMOL/L (ref 98–107)
CHLORIDE BLD-SCNC: 95 MMOL/L (ref 98–107)
CHLORIDE BLD-SCNC: 99 MMOL/L (ref 98–107)
CHP ED QC CHECK: NORMAL
CLARITY: CLEAR
CO2: 27 MMOL/L (ref 22–29)
CO2: 29 MMOL/L (ref 22–29)
CO2: 31 MMOL/L (ref 22–29)
CO2: 33 MMOL/L (ref 22–29)
COLOR: YELLOW
CREAT SERPL-MCNC: 0.6 MG/DL (ref 0.7–1.2)
EKG ATRIAL RATE: 78 BPM
EKG P AXIS: 42 DEGREES
EKG P-R INTERVAL: 182 MS
EKG Q-T INTERVAL: 396 MS
EKG QRS DURATION: 82 MS
EKG QTC CALCULATION (BAZETT): 451 MS
EKG R AXIS: -16 DEGREES
EKG T AXIS: 16 DEGREES
EKG VENTRICULAR RATE: 78 BPM
EOSINOPHILS ABSOLUTE: 0.11 E9/L (ref 0.05–0.5)
EOSINOPHILS ABSOLUTE: 0.15 E9/L (ref 0.05–0.5)
EOSINOPHILS RELATIVE PERCENT: 1.3 % (ref 0–6)
EOSINOPHILS RELATIVE PERCENT: 1.7 % (ref 0–6)
GFR AFRICAN AMERICAN: >60
GFR NON-AFRICAN AMERICAN: >60 ML/MIN/1.73
GLUCOSE BLD-MCNC: 119 MG/DL (ref 74–99)
GLUCOSE BLD-MCNC: 169 MG/DL (ref 74–99)
GLUCOSE BLD-MCNC: 172 MG/DL (ref 74–99)
GLUCOSE BLD-MCNC: 337 MG/DL
GLUCOSE BLD-MCNC: 365 MG/DL (ref 74–99)
GLUCOSE URINE: NEGATIVE MG/DL
HBA1C MFR BLD: 8.3 % (ref 4–5.6)
HCT VFR BLD CALC: 33.8 % (ref 37–54)
HCT VFR BLD CALC: 36.4 % (ref 37–54)
HEMOGLOBIN: 10.7 G/DL (ref 12.5–16.5)
HEMOGLOBIN: 9.9 G/DL (ref 12.5–16.5)
IMMATURE GRANULOCYTES #: 0.04 E9/L
IMMATURE GRANULOCYTES #: 0.04 E9/L
IMMATURE GRANULOCYTES %: 0.5 % (ref 0–5)
IMMATURE GRANULOCYTES %: 0.5 % (ref 0–5)
INR BLD: 2.4
INR BLD: 2.5
INR BLD: 2.8
INR BLD: 3
INR BLD: 3.4
INR BLD: 3.4
INTERNATIONAL NORMALIZATION RATIO, POC: 2.8
INTERNATIONAL NORMALIZATION RATIO, POC: 2.8
KETONES, URINE: NEGATIVE MG/DL
LEUKOCYTE ESTERASE, URINE: ABNORMAL
LYMPHOCYTES ABSOLUTE: 1.35 E9/L (ref 1.5–4)
LYMPHOCYTES ABSOLUTE: 1.46 E9/L (ref 1.5–4)
LYMPHOCYTES RELATIVE PERCENT: 16.4 % (ref 20–42)
LYMPHOCYTES RELATIVE PERCENT: 16.9 % (ref 20–42)
MAGNESIUM: 2.1 MG/DL (ref 1.6–2.6)
MCH RBC QN AUTO: 27.7 PG (ref 26–35)
MCH RBC QN AUTO: 27.9 PG (ref 26–35)
MCHC RBC AUTO-ENTMCNC: 29.3 % (ref 32–34.5)
MCHC RBC AUTO-ENTMCNC: 29.4 % (ref 32–34.5)
MCV RBC AUTO: 94.4 FL (ref 80–99.9)
MCV RBC AUTO: 94.8 FL (ref 80–99.9)
METER GLUCOSE: 102 MG/DL (ref 74–99)
METER GLUCOSE: 117 MG/DL (ref 74–99)
METER GLUCOSE: 120 MG/DL (ref 74–99)
METER GLUCOSE: 120 MG/DL (ref 74–99)
METER GLUCOSE: 151 MG/DL (ref 74–99)
METER GLUCOSE: 159 MG/DL (ref 74–99)
METER GLUCOSE: 167 MG/DL (ref 74–99)
METER GLUCOSE: 169 MG/DL (ref 74–99)
METER GLUCOSE: 179 MG/DL (ref 74–99)
METER GLUCOSE: 181 MG/DL (ref 74–99)
METER GLUCOSE: 187 MG/DL (ref 74–99)
METER GLUCOSE: 200 MG/DL (ref 74–99)
METER GLUCOSE: 205 MG/DL (ref 74–99)
METER GLUCOSE: 208 MG/DL (ref 74–99)
METER GLUCOSE: 216 MG/DL (ref 74–99)
METER GLUCOSE: 222 MG/DL (ref 74–99)
METER GLUCOSE: 246 MG/DL (ref 74–99)
METER GLUCOSE: 273 MG/DL (ref 74–99)
METER GLUCOSE: 317 MG/DL (ref 74–99)
METER GLUCOSE: 320 MG/DL (ref 74–99)
METER GLUCOSE: 337 MG/DL (ref 74–99)
METER GLUCOSE: 338 MG/DL (ref 74–99)
METER GLUCOSE: 387 MG/DL (ref 74–99)
METER GLUCOSE: 472 MG/DL (ref 74–99)
METER GLUCOSE: 59 MG/DL (ref 74–99)
METER GLUCOSE: 67 MG/DL (ref 74–99)
METER GLUCOSE: 70 MG/DL (ref 74–99)
METER GLUCOSE: 91 MG/DL (ref 74–99)
METER GLUCOSE: 98 MG/DL (ref 74–99)
MONOCYTES ABSOLUTE: 0.97 E9/L (ref 0.1–0.95)
MONOCYTES ABSOLUTE: 0.99 E9/L (ref 0.1–0.95)
MONOCYTES RELATIVE PERCENT: 11.2 % (ref 2–12)
MONOCYTES RELATIVE PERCENT: 12 % (ref 2–12)
NEUTROPHILS ABSOLUTE: 5.72 E9/L (ref 1.8–7.3)
NEUTROPHILS ABSOLUTE: 5.98 E9/L (ref 1.8–7.3)
NEUTROPHILS RELATIVE PERCENT: 69.2 % (ref 43–80)
NEUTROPHILS RELATIVE PERCENT: 69.3 % (ref 43–80)
NITRITE, URINE: NEGATIVE
ORGANISM: ABNORMAL
PDW BLD-RTO: 15.9 FL (ref 11.5–15)
PDW BLD-RTO: 16.1 FL (ref 11.5–15)
PH UA: 6 (ref 5–9)
PHOSPHORUS: 2.5 MG/DL (ref 2.5–4.5)
PLATELET # BLD: 190 E9/L (ref 130–450)
PLATELET # BLD: 192 E9/L (ref 130–450)
PMV BLD AUTO: 10 FL (ref 7–12)
PMV BLD AUTO: 10 FL (ref 7–12)
POTASSIUM REFLEX MAGNESIUM: 4 MMOL/L (ref 3.5–5)
POTASSIUM REFLEX MAGNESIUM: 4.1 MMOL/L (ref 3.5–5)
POTASSIUM SERPL-SCNC: 3.4 MMOL/L (ref 3.5–5)
POTASSIUM SERPL-SCNC: 3.8 MMOL/L (ref 3.5–5)
PROTEIN UA: NEGATIVE MG/DL
PROTHROMBIN TIME: 27.5 SEC (ref 9.3–12.4)
PROTHROMBIN TIME: 28.3 SEC (ref 9.3–12.4)
PROTHROMBIN TIME: 32.2 SEC (ref 9.3–12.4)
PROTHROMBIN TIME: 34.4 SEC (ref 9.3–12.4)
PROTHROMBIN TIME: 38.9 SEC (ref 9.3–12.4)
PROTHROMBIN TIME: 39.9 SEC (ref 9.3–12.4)
RBC # BLD: 3.58 E12/L (ref 3.8–5.8)
RBC # BLD: 3.84 E12/L (ref 3.8–5.8)
RBC UA: ABNORMAL /HPF (ref 0–2)
SODIUM BLD-SCNC: 140 MMOL/L (ref 132–146)
SODIUM BLD-SCNC: 142 MMOL/L (ref 132–146)
SODIUM BLD-SCNC: 143 MMOL/L (ref 132–146)
SODIUM BLD-SCNC: 144 MMOL/L (ref 132–146)
SPECIFIC GRAVITY UA: 1.01 (ref 1–1.03)
T4 FREE: 1.23 NG/DL (ref 0.93–1.7)
TOTAL CK: 29 U/L (ref 20–200)
TROPONIN: <0.01 NG/ML (ref 0–0.03)
TSH SERPL DL<=0.05 MIU/L-ACNC: 2.47 UIU/ML (ref 0.27–4.2)
URINE CULTURE, ROUTINE: ABNORMAL
UROBILINOGEN, URINE: 0.2 E.U./DL
WBC # BLD: 8.3 E9/L (ref 4.5–11.5)
WBC # BLD: 8.6 E9/L (ref 4.5–11.5)
WBC UA: ABNORMAL /HPF (ref 0–5)

## 2019-01-01 PROCEDURE — 99211 OFF/OP EST MAY X REQ PHY/QHP: CPT

## 2019-01-01 PROCEDURE — 84439 ASSAY OF FREE THYROXINE: CPT

## 2019-01-01 PROCEDURE — 6370000000 HC RX 637 (ALT 250 FOR IP): Performed by: FAMILY MEDICINE

## 2019-01-01 PROCEDURE — 97530 THERAPEUTIC ACTIVITIES: CPT

## 2019-01-01 PROCEDURE — 80048 BASIC METABOLIC PNL TOTAL CA: CPT

## 2019-01-01 PROCEDURE — G0378 HOSPITAL OBSERVATION PER HR: HCPCS

## 2019-01-01 PROCEDURE — 36415 COLL VENOUS BLD VENIPUNCTURE: CPT

## 2019-01-01 PROCEDURE — 85610 PROTHROMBIN TIME: CPT

## 2019-01-01 PROCEDURE — 2580000003 HC RX 258: Performed by: FAMILY MEDICINE

## 2019-01-01 PROCEDURE — 83735 ASSAY OF MAGNESIUM: CPT

## 2019-01-01 PROCEDURE — 1200000000 HC SEMI PRIVATE

## 2019-01-01 PROCEDURE — 87186 SC STD MICRODIL/AGAR DIL: CPT

## 2019-01-01 PROCEDURE — 71045 X-RAY EXAM CHEST 1 VIEW: CPT

## 2019-01-01 PROCEDURE — 82140 ASSAY OF AMMONIA: CPT

## 2019-01-01 PROCEDURE — 72131 CT LUMBAR SPINE W/O DYE: CPT

## 2019-01-01 PROCEDURE — 85025 COMPLETE CBC W/AUTO DIFF WBC: CPT

## 2019-01-01 PROCEDURE — 97161 PT EVAL LOW COMPLEX 20 MIN: CPT

## 2019-01-01 PROCEDURE — 87088 URINE BACTERIA CULTURE: CPT

## 2019-01-01 PROCEDURE — 84443 ASSAY THYROID STIM HORMONE: CPT

## 2019-01-01 PROCEDURE — 97127 HC SP THER IVNTJ W/FOCUS COG FUNCJ: CPT

## 2019-01-01 PROCEDURE — 82962 GLUCOSE BLOOD TEST: CPT

## 2019-01-01 PROCEDURE — 97165 OT EVAL LOW COMPLEX 30 MIN: CPT

## 2019-01-01 PROCEDURE — 96374 THER/PROPH/DIAG INJ IV PUSH: CPT

## 2019-01-01 PROCEDURE — A0425 GROUND MILEAGE: HCPCS

## 2019-01-01 PROCEDURE — 6370000000 HC RX 637 (ALT 250 FOR IP): Performed by: INTERNAL MEDICINE

## 2019-01-01 PROCEDURE — 72128 CT CHEST SPINE W/O DYE: CPT

## 2019-01-01 PROCEDURE — 6370000000 HC RX 637 (ALT 250 FOR IP)

## 2019-01-01 PROCEDURE — 6370000000 HC RX 637 (ALT 250 FOR IP): Performed by: EMERGENCY MEDICINE

## 2019-01-01 PROCEDURE — 81001 URINALYSIS AUTO W/SCOPE: CPT

## 2019-01-01 PROCEDURE — 83036 HEMOGLOBIN GLYCOSYLATED A1C: CPT

## 2019-01-01 PROCEDURE — 84100 ASSAY OF PHOSPHORUS: CPT

## 2019-01-01 PROCEDURE — A0428 BLS: HCPCS

## 2019-01-01 PROCEDURE — 93010 ELECTROCARDIOGRAM REPORT: CPT | Performed by: INTERNAL MEDICINE

## 2019-01-01 PROCEDURE — 93005 ELECTROCARDIOGRAM TRACING: CPT | Performed by: EMERGENCY MEDICINE

## 2019-01-01 PROCEDURE — 99285 EMERGENCY DEPT VISIT HI MDM: CPT

## 2019-01-01 PROCEDURE — 82550 ASSAY OF CK (CPK): CPT

## 2019-01-01 PROCEDURE — 84484 ASSAY OF TROPONIN QUANT: CPT

## 2019-01-01 RX ORDER — WARFARIN SODIUM 1 MG/1
1 TABLET ORAL
Status: COMPLETED | OUTPATIENT
Start: 2019-01-01 | End: 2019-01-01

## 2019-01-01 RX ORDER — DEXTROSE MONOHYDRATE 50 MG/ML
100 INJECTION, SOLUTION INTRAVENOUS PRN
Status: DISCONTINUED | OUTPATIENT
Start: 2019-01-01 | End: 2019-01-01 | Stop reason: HOSPADM

## 2019-01-01 RX ORDER — NICOTINE POLACRILEX 4 MG
15 LOZENGE BUCCAL PRN
Status: DISCONTINUED | OUTPATIENT
Start: 2019-01-01 | End: 2019-01-01 | Stop reason: HOSPADM

## 2019-01-01 RX ORDER — PETROLATUM 42 G/100G
OINTMENT TOPICAL
Qty: 1 TUBE | Refills: 0 | Status: SHIPPED | OUTPATIENT
Start: 2019-01-01

## 2019-01-01 RX ORDER — PETROLATUM 42 G/100G
OINTMENT TOPICAL 3 TIMES DAILY PRN
Status: DISCONTINUED | OUTPATIENT
Start: 2019-01-01 | End: 2019-01-01 | Stop reason: HOSPADM

## 2019-01-01 RX ORDER — LISINOPRIL 10 MG/1
10 TABLET ORAL EVERY MORNING
Status: DISCONTINUED | OUTPATIENT
Start: 2019-01-01 | End: 2019-01-01 | Stop reason: HOSPADM

## 2019-01-01 RX ORDER — SODIUM CHLORIDE 0.9 % (FLUSH) 0.9 %
10 SYRINGE (ML) INJECTION EVERY 12 HOURS SCHEDULED
Status: DISCONTINUED | OUTPATIENT
Start: 2019-01-01 | End: 2019-01-01 | Stop reason: HOSPADM

## 2019-01-01 RX ORDER — VITS A,C,E/LUTEIN/MINERALS 300MCG-200
2 TABLET ORAL 2 TIMES DAILY
Status: DISCONTINUED | OUTPATIENT
Start: 2019-01-01 | End: 2019-01-01 | Stop reason: HOSPADM

## 2019-01-01 RX ORDER — SOTALOL HYDROCHLORIDE 120 MG/1
120 TABLET ORAL DAILY
Status: DISCONTINUED | OUTPATIENT
Start: 2019-01-01 | End: 2019-01-01 | Stop reason: HOSPADM

## 2019-01-01 RX ORDER — ASPIRIN 81 MG/1
81 TABLET ORAL EVERY MORNING
Status: DISCONTINUED | OUTPATIENT
Start: 2019-01-01 | End: 2019-01-01 | Stop reason: HOSPADM

## 2019-01-01 RX ORDER — WARFARIN SODIUM 1 MG/1
0.5 TABLET ORAL
Status: COMPLETED | OUTPATIENT
Start: 2019-01-01 | End: 2019-01-01

## 2019-01-01 RX ORDER — ACETAMINOPHEN 325 MG/1
650 TABLET ORAL EVERY 6 HOURS PRN
Status: DISCONTINUED | OUTPATIENT
Start: 2019-01-01 | End: 2019-01-01 | Stop reason: HOSPADM

## 2019-01-01 RX ORDER — INSULIN GLARGINE 100 [IU]/ML
23 INJECTION, SOLUTION SUBCUTANEOUS EVERY MORNING
Status: DISCONTINUED | OUTPATIENT
Start: 2019-01-01 | End: 2019-01-01

## 2019-01-01 RX ORDER — INSULIN GLARGINE 100 [IU]/ML
27 INJECTION, SOLUTION SUBCUTANEOUS EVERY MORNING
Status: DISCONTINUED | OUTPATIENT
Start: 2019-01-01 | End: 2019-01-01 | Stop reason: HOSPADM

## 2019-01-01 RX ORDER — POTASSIUM CHLORIDE 7.45 MG/ML
10 INJECTION INTRAVENOUS PRN
Status: DISCONTINUED | OUTPATIENT
Start: 2019-01-01 | End: 2019-01-01 | Stop reason: HOSPADM

## 2019-01-01 RX ORDER — DEXTROSE MONOHYDRATE 25 G/50ML
12.5 INJECTION, SOLUTION INTRAVENOUS PRN
Status: DISCONTINUED | OUTPATIENT
Start: 2019-01-01 | End: 2019-01-01 | Stop reason: HOSPADM

## 2019-01-01 RX ORDER — WARFARIN SODIUM 1 MG/1
1 TABLET ORAL
Status: DISCONTINUED | OUTPATIENT
Start: 2019-01-01 | End: 2019-01-01 | Stop reason: HOSPADM

## 2019-01-01 RX ORDER — INSULIN GLARGINE 100 [IU]/ML
27 INJECTION, SOLUTION SUBCUTANEOUS EVERY MORNING
Qty: 1 VIAL | Refills: 3 | Status: SHIPPED | OUTPATIENT
Start: 2019-01-01

## 2019-01-01 RX ORDER — SIMVASTATIN 40 MG
40 TABLET ORAL NIGHTLY
Status: DISCONTINUED | OUTPATIENT
Start: 2019-01-01 | End: 2019-01-01 | Stop reason: HOSPADM

## 2019-01-01 RX ORDER — PETROLATUM 42 G/100G
OINTMENT TOPICAL 2 TIMES DAILY
Status: DISCONTINUED | OUTPATIENT
Start: 2019-01-01 | End: 2019-01-01 | Stop reason: HOSPADM

## 2019-01-01 RX ORDER — MIRTAZAPINE 15 MG/1
15 TABLET, FILM COATED ORAL NIGHTLY
Status: DISCONTINUED | OUTPATIENT
Start: 2019-01-01 | End: 2019-01-01 | Stop reason: HOSPADM

## 2019-01-01 RX ORDER — SODIUM CHLORIDE 0.9 % (FLUSH) 0.9 %
10 SYRINGE (ML) INJECTION PRN
Status: DISCONTINUED | OUTPATIENT
Start: 2019-01-01 | End: 2019-01-01 | Stop reason: HOSPADM

## 2019-01-01 RX ORDER — WARFARIN SODIUM 1 MG/1
0.5 TABLET ORAL
Status: DISCONTINUED | OUTPATIENT
Start: 2019-01-01 | End: 2019-01-01 | Stop reason: DRUGHIGH

## 2019-01-01 RX ORDER — VITAMIN C
1 TAB ORAL EVERY MORNING
Status: DISCONTINUED | OUTPATIENT
Start: 2019-01-01 | End: 2019-01-01 | Stop reason: HOSPADM

## 2019-01-01 RX ORDER — FOLIC ACID 1 MG/1
1 TABLET ORAL EVERY MORNING
Status: DISCONTINUED | OUTPATIENT
Start: 2019-01-01 | End: 2019-01-01 | Stop reason: HOSPADM

## 2019-01-01 RX ORDER — CLONIDINE HYDROCHLORIDE 0.1 MG/1
TABLET ORAL
Status: COMPLETED
Start: 2019-01-01 | End: 2019-01-01

## 2019-01-01 RX ORDER — SOTALOL HYDROCHLORIDE 120 MG/1
120 TABLET ORAL ONCE
Status: COMPLETED | OUTPATIENT
Start: 2019-01-01 | End: 2019-01-01

## 2019-01-01 RX ORDER — WARFARIN SODIUM 1 MG/1
1 TABLET ORAL
Status: DISCONTINUED | OUTPATIENT
Start: 2019-01-01 | End: 2019-01-01 | Stop reason: DRUGHIGH

## 2019-01-01 RX ORDER — WARFARIN SODIUM 1 MG/1
0.5 TABLET ORAL
Status: DISCONTINUED | OUTPATIENT
Start: 2019-01-01 | End: 2019-01-01 | Stop reason: HOSPADM

## 2019-01-01 RX ORDER — CLONIDINE HYDROCHLORIDE 0.1 MG/1
0.1 TABLET ORAL ONCE
Status: COMPLETED | OUTPATIENT
Start: 2019-01-01 | End: 2019-01-01

## 2019-01-01 RX ORDER — POTASSIUM CHLORIDE 20 MEQ/1
40 TABLET, EXTENDED RELEASE ORAL PRN
Status: DISCONTINUED | OUTPATIENT
Start: 2019-01-01 | End: 2019-01-01 | Stop reason: HOSPADM

## 2019-01-01 RX ADMIN — SOTALOL HYDROCHLORIDE 120 MG: 120 TABLET ORAL at 08:45

## 2019-01-01 RX ADMIN — MIRTAZAPINE 15 MG: 15 TABLET, FILM COATED ORAL at 20:42

## 2019-01-01 RX ADMIN — VITAMIN C 1 TABLET: TAB at 13:37

## 2019-01-01 RX ADMIN — SIMVASTATIN 40 MG: 40 TABLET, FILM COATED ORAL at 20:44

## 2019-01-01 RX ADMIN — INSULIN LISPRO 8 UNITS: 100 INJECTION, SOLUTION INTRAVENOUS; SUBCUTANEOUS at 12:32

## 2019-01-01 RX ADMIN — MUPIROCIN: 20 OINTMENT TOPICAL at 08:45

## 2019-01-01 RX ADMIN — FOLIC ACID 1 MG: 1 TABLET ORAL at 08:29

## 2019-01-01 RX ADMIN — Medication 10 ML: at 20:31

## 2019-01-01 RX ADMIN — WARFARIN SODIUM 0.5 MG: 1 TABLET ORAL at 18:58

## 2019-01-01 RX ADMIN — ASPIRIN 81 MG: 81 TABLET, COATED ORAL at 08:54

## 2019-01-01 RX ADMIN — Medication 10 ML: at 20:45

## 2019-01-01 RX ADMIN — INSULIN LISPRO 2 UNITS: 100 INJECTION, SOLUTION INTRAVENOUS; SUBCUTANEOUS at 12:22

## 2019-01-01 RX ADMIN — INSULIN LISPRO 4 UNITS: 100 INJECTION, SOLUTION INTRAVENOUS; SUBCUTANEOUS at 07:33

## 2019-01-01 RX ADMIN — MUPIROCIN: 20 OINTMENT TOPICAL at 13:50

## 2019-01-01 RX ADMIN — LINAGLIPTIN 5 MG: 5 TABLET, FILM COATED ORAL at 08:31

## 2019-01-01 RX ADMIN — Medication 10 ML: at 08:32

## 2019-01-01 RX ADMIN — ASPIRIN 81 MG: 81 TABLET, COATED ORAL at 08:29

## 2019-01-01 RX ADMIN — WARFARIN SODIUM 1 MG: 1 TABLET ORAL at 17:53

## 2019-01-01 RX ADMIN — SOTALOL HYDROCHLORIDE 120 MG: 120 TABLET ORAL at 08:47

## 2019-01-01 RX ADMIN — INSULIN LISPRO 2 UNITS: 100 INJECTION, SOLUTION INTRAVENOUS; SUBCUTANEOUS at 12:18

## 2019-01-01 RX ADMIN — INSULIN LISPRO 6 UNITS: 100 INJECTION, SOLUTION INTRAVENOUS; SUBCUTANEOUS at 06:27

## 2019-01-01 RX ADMIN — SIMVASTATIN 40 MG: 40 TABLET, FILM COATED ORAL at 22:17

## 2019-01-01 RX ADMIN — Medication 10 ML: at 08:41

## 2019-01-01 RX ADMIN — MIRTAZAPINE 15 MG: 15 TABLET, FILM COATED ORAL at 20:44

## 2019-01-01 RX ADMIN — INSULIN LISPRO 2 UNITS: 100 INJECTION, SOLUTION INTRAVENOUS; SUBCUTANEOUS at 07:01

## 2019-01-01 RX ADMIN — PETROLATUM: 42 OINTMENT TOPICAL at 16:28

## 2019-01-01 RX ADMIN — INSULIN LISPRO 2 UNITS: 100 INJECTION, SOLUTION INTRAVENOUS; SUBCUTANEOUS at 17:49

## 2019-01-01 RX ADMIN — MUPIROCIN: 20 OINTMENT TOPICAL at 11:09

## 2019-01-01 RX ADMIN — FOLIC ACID 1 MG: 1 TABLET ORAL at 08:44

## 2019-01-01 RX ADMIN — LISINOPRIL 10 MG: 10 TABLET ORAL at 08:53

## 2019-01-01 RX ADMIN — INSULIN LISPRO 4 UNITS: 100 INJECTION, SOLUTION INTRAVENOUS; SUBCUTANEOUS at 16:45

## 2019-01-01 RX ADMIN — CYCLOPENTOLATE HYDROCHLORIDE 2 TABLET: 10 SOLUTION/ DROPS OPHTHALMIC at 22:17

## 2019-01-01 RX ADMIN — INSULIN GLARGINE 23 UNITS: 100 INJECTION, SOLUTION SUBCUTANEOUS at 08:48

## 2019-01-01 RX ADMIN — ACETAMINOPHEN 650 MG: 325 TABLET, FILM COATED ORAL at 10:43

## 2019-01-01 RX ADMIN — VITAMIN C 1 TABLET: TAB at 08:41

## 2019-01-01 RX ADMIN — Medication 10 ML: at 08:46

## 2019-01-01 RX ADMIN — MIRTAZAPINE 15 MG: 15 TABLET, FILM COATED ORAL at 22:17

## 2019-01-01 RX ADMIN — POTASSIUM CHLORIDE 40 MEQ: 20 TABLET, EXTENDED RELEASE ORAL at 13:41

## 2019-01-01 RX ADMIN — CYCLOPENTOLATE HYDROCHLORIDE 2 TABLET: 10 SOLUTION/ DROPS OPHTHALMIC at 08:41

## 2019-01-01 RX ADMIN — MIRTAZAPINE 15 MG: 15 TABLET, FILM COATED ORAL at 20:30

## 2019-01-01 RX ADMIN — INSULIN LISPRO 2 UNITS: 100 INJECTION, SOLUTION INTRAVENOUS; SUBCUTANEOUS at 07:03

## 2019-01-01 RX ADMIN — INSULIN GLARGINE 23 UNITS: 100 INJECTION, SOLUTION SUBCUTANEOUS at 08:49

## 2019-01-01 RX ADMIN — LISINOPRIL 10 MG: 10 TABLET ORAL at 08:47

## 2019-01-01 RX ADMIN — SOTALOL HYDROCHLORIDE 120 MG: 120 TABLET ORAL at 08:53

## 2019-01-01 RX ADMIN — LISINOPRIL 10 MG: 10 TABLET ORAL at 08:41

## 2019-01-01 RX ADMIN — INSULIN LISPRO 4 UNITS: 100 INJECTION, SOLUTION INTRAVENOUS; SUBCUTANEOUS at 17:07

## 2019-01-01 RX ADMIN — LINAGLIPTIN 5 MG: 5 TABLET, FILM COATED ORAL at 08:47

## 2019-01-01 RX ADMIN — VITAMIN C 1 TABLET: TAB at 08:47

## 2019-01-01 RX ADMIN — DEXTROSE MONOHYDRATE 12.5 G: 500 INJECTION PARENTERAL at 06:30

## 2019-01-01 RX ADMIN — CLONIDINE HYDROCHLORIDE 0.1 MG: 0.1 TABLET ORAL at 13:39

## 2019-01-01 RX ADMIN — INSULIN GLARGINE 27 UNITS: 100 INJECTION, SOLUTION SUBCUTANEOUS at 08:54

## 2019-01-01 RX ADMIN — CYCLOPENTOLATE HYDROCHLORIDE 2 TABLET: 10 SOLUTION/ DROPS OPHTHALMIC at 08:53

## 2019-01-01 RX ADMIN — ASPIRIN 81 MG: 81 TABLET, COATED ORAL at 08:45

## 2019-01-01 RX ADMIN — MUPIROCIN: 20 OINTMENT TOPICAL at 08:48

## 2019-01-01 RX ADMIN — SOTALOL HYDROCHLORIDE 120 MG: 120 TABLET ORAL at 13:37

## 2019-01-01 RX ADMIN — FOLIC ACID 1 MG: 1 TABLET ORAL at 08:48

## 2019-01-01 RX ADMIN — INSULIN LISPRO 2 UNITS: 100 INJECTION, SOLUTION INTRAVENOUS; SUBCUTANEOUS at 20:42

## 2019-01-01 RX ADMIN — LINAGLIPTIN 5 MG: 5 TABLET, FILM COATED ORAL at 08:45

## 2019-01-01 RX ADMIN — Medication 10 ML: at 09:04

## 2019-01-01 RX ADMIN — WARFARIN SODIUM 1 MG: 1 TABLET ORAL at 17:39

## 2019-01-01 RX ADMIN — PETROLATUM: 42 OINTMENT TOPICAL at 20:44

## 2019-01-01 RX ADMIN — CYCLOPENTOLATE HYDROCHLORIDE 2 TABLET: 10 SOLUTION/ DROPS OPHTHALMIC at 08:31

## 2019-01-01 RX ADMIN — Medication 10 ML: at 20:46

## 2019-01-01 RX ADMIN — INSULIN LISPRO 10 UNITS: 100 INJECTION, SOLUTION INTRAVENOUS; SUBCUTANEOUS at 12:15

## 2019-01-01 RX ADMIN — INSULIN GLARGINE 23 UNITS: 100 INJECTION, SOLUTION SUBCUTANEOUS at 08:45

## 2019-01-01 RX ADMIN — LINAGLIPTIN 5 MG: 5 TABLET, FILM COATED ORAL at 08:41

## 2019-01-01 RX ADMIN — CYCLOPENTOLATE HYDROCHLORIDE 2 TABLET: 10 SOLUTION/ DROPS OPHTHALMIC at 20:45

## 2019-01-01 RX ADMIN — VITAMIN C 1 TABLET: TAB at 08:45

## 2019-01-01 RX ADMIN — FOLIC ACID 1 MG: 1 TABLET ORAL at 13:36

## 2019-01-01 RX ADMIN — SOTALOL HYDROCHLORIDE 120 MG: 120 TABLET ORAL at 22:11

## 2019-01-01 RX ADMIN — LINAGLIPTIN 5 MG: 5 TABLET, FILM COATED ORAL at 13:37

## 2019-01-01 RX ADMIN — SIMVASTATIN 40 MG: 40 TABLET, FILM COATED ORAL at 20:42

## 2019-01-01 RX ADMIN — SIMVASTATIN 40 MG: 40 TABLET, FILM COATED ORAL at 20:30

## 2019-01-01 RX ADMIN — CYCLOPENTOLATE HYDROCHLORIDE 2 TABLET: 10 SOLUTION/ DROPS OPHTHALMIC at 02:34

## 2019-01-01 RX ADMIN — INSULIN LISPRO 4 UNITS: 100 INJECTION, SOLUTION INTRAVENOUS; SUBCUTANEOUS at 20:34

## 2019-01-01 RX ADMIN — LISINOPRIL 10 MG: 10 TABLET ORAL at 13:37

## 2019-01-01 RX ADMIN — Medication 10 ML: at 22:17

## 2019-01-01 RX ADMIN — LISINOPRIL 10 MG: 10 TABLET ORAL at 08:45

## 2019-01-01 RX ADMIN — VITAMIN C 1 TABLET: TAB at 08:53

## 2019-01-01 RX ADMIN — INSULIN LISPRO 2 UNITS: 100 INJECTION, SOLUTION INTRAVENOUS; SUBCUTANEOUS at 12:35

## 2019-01-01 RX ADMIN — Medication 10 ML: at 14:17

## 2019-01-01 RX ADMIN — INSULIN GLARGINE 27 UNITS: 100 INJECTION, SOLUTION SUBCUTANEOUS at 08:47

## 2019-01-01 RX ADMIN — INSULIN LISPRO 4 UNITS: 100 INJECTION, SOLUTION INTRAVENOUS; SUBCUTANEOUS at 06:36

## 2019-01-01 RX ADMIN — INSULIN LISPRO 8 UNITS: 100 INJECTION, SOLUTION INTRAVENOUS; SUBCUTANEOUS at 02:38

## 2019-01-01 RX ADMIN — CYCLOPENTOLATE HYDROCHLORIDE 2 TABLET: 10 SOLUTION/ DROPS OPHTHALMIC at 13:37

## 2019-01-01 RX ADMIN — CYCLOPENTOLATE HYDROCHLORIDE 2 TABLET: 10 SOLUTION/ DROPS OPHTHALMIC at 20:41

## 2019-01-01 RX ADMIN — Medication 10 ML: at 10:18

## 2019-01-01 RX ADMIN — SOTALOL HYDROCHLORIDE 120 MG: 120 TABLET ORAL at 08:41

## 2019-01-01 RX ADMIN — LINAGLIPTIN 5 MG: 5 TABLET, FILM COATED ORAL at 08:53

## 2019-01-01 RX ADMIN — MUPIROCIN: 20 OINTMENT TOPICAL at 14:42

## 2019-01-01 RX ADMIN — CYCLOPENTOLATE HYDROCHLORIDE 2 TABLET: 10 SOLUTION/ DROPS OPHTHALMIC at 08:45

## 2019-01-01 RX ADMIN — FOLIC ACID 1 MG: 1 TABLET ORAL at 11:09

## 2019-01-01 RX ADMIN — CYCLOPENTOLATE HYDROCHLORIDE 2 TABLET: 10 SOLUTION/ DROPS OPHTHALMIC at 20:31

## 2019-01-01 RX ADMIN — INSULIN LISPRO 8 UNITS: 100 INJECTION, SOLUTION INTRAVENOUS; SUBCUTANEOUS at 16:36

## 2019-01-01 RX ADMIN — CYCLOPENTOLATE HYDROCHLORIDE 2 TABLET: 10 SOLUTION/ DROPS OPHTHALMIC at 08:47

## 2019-01-01 RX ADMIN — LISINOPRIL 10 MG: 10 TABLET ORAL at 08:32

## 2019-01-01 RX ADMIN — VITAMIN C 1 TABLET: TAB at 08:32

## 2019-01-01 RX ADMIN — SOTALOL HYDROCHLORIDE 120 MG: 120 TABLET ORAL at 08:32

## 2019-01-01 ASSESSMENT — PAIN DESCRIPTION - PAIN TYPE
TYPE: CHRONIC PAIN
TYPE: CHRONIC PAIN

## 2019-01-01 ASSESSMENT — PAIN SCALES - WONG BAKER
WONGBAKER_NUMERICALRESPONSE: 8
WONGBAKER_NUMERICALRESPONSE: 8
WONGBAKER_NUMERICALRESPONSE: 0

## 2019-01-01 ASSESSMENT — PAIN DESCRIPTION - ORIENTATION: ORIENTATION: RIGHT;LEFT

## 2019-01-01 ASSESSMENT — PAIN SCALES - GENERAL
PAINLEVEL_OUTOF10: 0
PAINLEVEL_OUTOF10: 0
PAINLEVEL_OUTOF10: 5
PAINLEVEL_OUTOF10: 0

## 2019-01-01 ASSESSMENT — PAIN DESCRIPTION - FREQUENCY: FREQUENCY: INTERMITTENT

## 2019-01-01 ASSESSMENT — PAIN DESCRIPTION - LOCATION
LOCATION: LEG
LOCATION: LEG

## 2019-01-01 ASSESSMENT — PAIN DESCRIPTION - ONSET: ONSET: ON-GOING

## 2019-01-01 ASSESSMENT — PAIN DESCRIPTION - DESCRIPTORS: DESCRIPTORS: ACHING;DISCOMFORT

## 2019-01-02 ENCOUNTER — HOSPITAL ENCOUNTER (OUTPATIENT)
Dept: PHARMACY | Age: 84
Setting detail: THERAPIES SERIES
Discharge: HOME | End: 2019-01-02
Payer: MEDICARE

## 2019-01-02 VITALS — SYSTOLIC BLOOD PRESSURE: 141 MMHG | HEART RATE: 64 BPM | DIASTOLIC BLOOD PRESSURE: 78 MMHG

## 2019-01-02 DIAGNOSIS — Z86.79 HISTORY OF ATRIAL FIBRILLATION: ICD-10-CM

## 2019-01-02 LAB — INTERNATIONAL NORMALIZATION RATIO, POC: 2.9

## 2019-01-02 PROCEDURE — 85610 PROTHROMBIN TIME: CPT

## 2019-01-02 PROCEDURE — 99211 OFF/OP EST MAY X REQ PHY/QHP: CPT

## 2019-01-28 ENCOUNTER — HOSPITAL ENCOUNTER (OUTPATIENT)
Dept: PHARMACY | Age: 84
Setting detail: THERAPIES SERIES
Discharge: HOME | End: 2019-01-28
Payer: MEDICARE

## 2019-01-28 VITALS
HEART RATE: 61 BPM | DIASTOLIC BLOOD PRESSURE: 69 MMHG | WEIGHT: 172.4 LBS | SYSTOLIC BLOOD PRESSURE: 110 MMHG | BODY MASS INDEX: 27 KG/M2

## 2019-01-28 DIAGNOSIS — Z86.79 HISTORY OF ATRIAL FIBRILLATION: ICD-10-CM

## 2019-01-28 LAB — INTERNATIONAL NORMALIZATION RATIO, POC: 2.3

## 2019-01-28 PROCEDURE — 99211 OFF/OP EST MAY X REQ PHY/QHP: CPT

## 2019-01-28 PROCEDURE — 85610 PROTHROMBIN TIME: CPT

## 2019-02-14 RX ORDER — WARFARIN SODIUM 1 MG/1
TABLET ORAL
Qty: 150 TABLET | Refills: 3 | Status: SHIPPED | OUTPATIENT
Start: 2019-02-14

## 2019-02-28 ENCOUNTER — HOSPITAL ENCOUNTER (OUTPATIENT)
Dept: PHARMACY | Age: 84
Setting detail: THERAPIES SERIES
Discharge: HOME | End: 2019-02-28
Payer: MEDICARE

## 2019-02-28 VITALS
DIASTOLIC BLOOD PRESSURE: 67 MMHG | WEIGHT: 175.4 LBS | HEART RATE: 60 BPM | SYSTOLIC BLOOD PRESSURE: 114 MMHG | BODY MASS INDEX: 27.47 KG/M2

## 2019-02-28 DIAGNOSIS — Z86.79 HISTORY OF ATRIAL FIBRILLATION: ICD-10-CM

## 2019-02-28 LAB — INTERNATIONAL NORMALIZATION RATIO, POC: 3

## 2019-02-28 PROCEDURE — 85610 PROTHROMBIN TIME: CPT

## 2019-02-28 PROCEDURE — 99211 OFF/OP EST MAY X REQ PHY/QHP: CPT

## 2019-03-28 ENCOUNTER — HOSPITAL ENCOUNTER (OUTPATIENT)
Dept: PHARMACY | Age: 84
Setting detail: THERAPIES SERIES
Discharge: HOME | End: 2019-03-28
Payer: MEDICARE

## 2019-03-28 ENCOUNTER — APPOINTMENT (OUTPATIENT)
Dept: CT IMAGING | Age: 84
End: 2019-03-28
Payer: MEDICARE

## 2019-03-28 ENCOUNTER — TELEPHONE (OUTPATIENT)
Dept: PHARMACY | Age: 84
End: 2019-03-28

## 2019-03-28 ENCOUNTER — HOSPITAL ENCOUNTER (EMERGENCY)
Age: 84
Discharge: HOME | End: 2019-03-28
Attending: EMERGENCY MEDICINE
Payer: MEDICARE

## 2019-03-28 VITALS
HEART RATE: 62 BPM | SYSTOLIC BLOOD PRESSURE: 136 MMHG | BODY MASS INDEX: 27.44 KG/M2 | WEIGHT: 175.2 LBS | DIASTOLIC BLOOD PRESSURE: 83 MMHG

## 2019-03-28 VITALS
TEMPERATURE: 98 F | DIASTOLIC BLOOD PRESSURE: 78 MMHG | HEIGHT: 67 IN | OXYGEN SATURATION: 95 % | SYSTOLIC BLOOD PRESSURE: 169 MMHG | RESPIRATION RATE: 16 BRPM | WEIGHT: 174 LBS | BODY MASS INDEX: 27.31 KG/M2

## 2019-03-28 DIAGNOSIS — R79.1 SUPRATHERAPEUTIC INR: ICD-10-CM

## 2019-03-28 DIAGNOSIS — S09.90XA CLOSED HEAD INJURY, INITIAL ENCOUNTER: Primary | ICD-10-CM

## 2019-03-28 DIAGNOSIS — Z86.79 HISTORY OF ATRIAL FIBRILLATION: ICD-10-CM

## 2019-03-28 LAB — INR BLD: 3.7

## 2019-03-28 PROCEDURE — 70450 CT HEAD/BRAIN W/O DYE: CPT

## 2019-03-28 PROCEDURE — 85610 PROTHROMBIN TIME: CPT

## 2019-03-28 PROCEDURE — 99211 OFF/OP EST MAY X REQ PHY/QHP: CPT

## 2019-03-28 PROCEDURE — 99283 EMERGENCY DEPT VISIT LOW MDM: CPT

## 2019-03-28 ASSESSMENT — ENCOUNTER SYMPTOMS
BACK PAIN: 0
WHEEZING: 0
COUGH: 0
NAUSEA: 0
EYE DISCHARGE: 0
SINUS PRESSURE: 0
VOMITING: 0
EYE REDNESS: 0
EYE PAIN: 0
SORE THROAT: 0
SHORTNESS OF BREATH: 0
ABDOMINAL PAIN: 0
DIARRHEA: 0

## 2019-03-28 NOTE — ED PROVIDER NOTES
Options  Closed head injury, initial encounter:   Supratherapeutic INR:   Diagnosis management comments: Patient on Coumadin presented with mechanical fall and head injury. INR was 3.7 at the Coumadin clinic today. Patient had no loss of consciousness and did not have any complaints on exam. He had no neurological focal deficits. CT head was obtained with no evidence of intracranial pathology. He was advised to hold his Coumadin for today and tomorrow and to started again on Saturday. He was advised to follow-up with his PCP and given strict return instructions.                --------------------------------------------- PAST HISTORY ---------------------------------------------  Past Medical History:  has a past medical history of Asymptomatic bilateral carotid artery stenosis, Atrial fibrillation and flutter ([facility] BPH (benign prostatic hyperplasia), Carotid artery stenosis, Diabetes mellitus ([facility] Full dentures, History of nephrolithiasis, [place] (hard of hearing), Hyperlipidemia, Hypertension, Macular degeneration, Skin cancer of scalp, Unspecified cerebral artery occlusion with cerebral infarction, and Uses walker. Past Surgical History:  has a past surgical history that includes Carotid endarterectomy (Left, 10-7-14); hernia repair; skin biopsy (7/2014); Cystoscopy (11/18/2014); Cystoscopy (Left, 07/11/2017); Cystocopy (Left, 08/24/2017); and Kidney stone surgery. Social History:  reports that he quit smoking about 20 years ago. His smoking use included cigarettes. He started smoking about 63 years ago. He smoked 1.00 pack per day. His smokeless tobacco use includes snuff and chew. He reports that he does not drink alcohol or use drugs. Family History: family history includes Heart Disease in his brother, father, and mother. The patients home medications have been reviewed.     Allergies: Bacitracin and Percocet [oxycodone-acetaminophen]    -------------------------------------------------- RESULTS -------------------------------------------------  Labs:  No results found for this visit on 03/28/19. Radiology:  CT HEAD WO CONTRAST   Final Result   Diffuse atrophy likely age related   Findings compatible with small vessel ischemic changes. ------------------------- NURSING NOTES AND VITALS REVIEWED ---------------------------  Date / Time Roomed:  3/28/2019 12:32 PM  ED Bed Assignment:  [email or web address]    The nursing notes within the ED encounter and vital signs as below have been reviewed. BP (!) 169/78   Temp 98 °F (36.7 °C) (Oral)   Resp 16   Ht 5' 7\" (1.702 m)   Wt 174 lb (78.9 kg)   SpO2 95%   BMI 27.25 kg/m²   Oxygen Saturation Interpretation: Normal      ------------------------------------------ PROGRESS NOTES ------------------------------------------  I have spoken with the patient and discussed todays results, in addition to providing specific details for the plan of care and counseling regarding the diagnosis and prognosis. Their questions are answered at this time and they are agreeable with the plan. I discussed at length with them reasons for immediate return here for re evaluation. They will followup with primary care by calling their office tomorrow. --------------------------------- ADDITIONAL PROVIDER NOTES ---------------------------------  At this time the patient is without objective evidence of an acute process requiring hospitalization or inpatient management. They have remained hemodynamically stable throughout their entire ED visit and are stable for discharge with outpatient follow-up. The plan has been discussed in detail and they are aware of the specific conditions for emergent return, as well as the importance of follow-up. Discharge Medication List as of 3/28/2019  3:54 PM          Diagnosis:  1. Closed head injury, initial encounter    2.  Supratherapeutic INR        Disposition:  Patient's disposition: Discharge to home  Patient's condition is stable.             Ann Villanueva DO  Resident  03/28/19 9411

## 2019-04-01 ENCOUNTER — TELEPHONE (OUTPATIENT)
Dept: NON INVASIVE DIAGNOSTICS | Age: 84
End: 2019-04-01

## 2019-04-01 DIAGNOSIS — Z79.899 LONG-TERM CURRENT USE OF HIGH RISK MEDICATION OTHER THAN ANTICOAGULANT: Primary | ICD-10-CM

## 2019-04-01 DIAGNOSIS — I48.0 PAROXYSMAL ATRIAL FIBRILLATION (HCC): ICD-10-CM

## 2019-04-01 NOTE — TELEPHONE ENCOUNTER
Spoke with the patient's wife to follow up since Dr Jackie Franks is no longer here. Patient's wife stated that he is not having any issues. Pt is due for some lab work and EKG for Sotalol ordered under CK and will have done at Dr Madai Balbuena office. Told the patient that we we are going to follow all EP medications, and devices. Patient does not follow with cardiology. Told the patient's wife to call us if they have any urgent matters. Verbalized understanding.

## 2019-04-02 ENCOUNTER — TELEPHONE (OUTPATIENT)
Dept: VASCULAR SURGERY | Age: 84
End: 2019-04-02

## 2019-04-02 DIAGNOSIS — I65.23 BILATERAL CAROTID ARTERY STENOSIS: Primary | ICD-10-CM

## 2019-04-02 NOTE — TELEPHONE ENCOUNTER
Shikha Salima notified that Dr. Verneda Severance office does not do EKGs or lab work as recommended in the note from Dr. Alex gross. She was instructed to check with Dr. Alex gross or his PCP.

## 2019-04-05 ENCOUNTER — HOSPITAL ENCOUNTER (OUTPATIENT)
Age: 84
Discharge: HOME | End: 2019-04-07
Payer: MEDICARE

## 2019-04-05 ENCOUNTER — NURSE ONLY (OUTPATIENT)
Dept: NON INVASIVE DIAGNOSTICS | Age: 84
End: 2019-04-05
Payer: MEDICARE

## 2019-04-05 DIAGNOSIS — I48.0 PAROXYSMAL ATRIAL FIBRILLATION (HCC): ICD-10-CM

## 2019-04-05 DIAGNOSIS — Z79.899 LONG-TERM CURRENT USE OF HIGH RISK MEDICATION OTHER THAN ANTICOAGULANT: ICD-10-CM

## 2019-04-05 DIAGNOSIS — Z79.899 ENCOUNTER FOR MONITORING SOTALOL THERAPY: Primary | ICD-10-CM

## 2019-04-05 DIAGNOSIS — Z51.81 ENCOUNTER FOR MONITORING SOTALOL THERAPY: Primary | ICD-10-CM

## 2019-04-05 LAB
ANION GAP SERPL CALCULATED.3IONS-SCNC: 11 MMOL/L (ref 7–16)
BUN BLDV-MCNC: 15 MG/DL (ref 8–23)
CALCIUM SERPL-MCNC: 8.7 MG/DL (ref 8.6–10.2)
CHLORIDE BLD-SCNC: 98 MMOL/L (ref 98–107)
CO2: 30 MMOL/L (ref 22–29)
CREAT SERPL-MCNC: 0.8 MG/DL (ref 0.7–1.2)
GFR AFRICAN AMERICAN: >60
GFR NON-AFRICAN AMERICAN: >60 ML/MIN/1.73
GLUCOSE BLD-MCNC: 212 MG/DL (ref 74–99)
POTASSIUM SERPL-SCNC: 4.2 MMOL/L (ref 3.5–5)
SODIUM BLD-SCNC: 139 MMOL/L (ref 132–146)

## 2019-04-05 PROCEDURE — 93000 ELECTROCARDIOGRAM COMPLETE: CPT | Performed by: INTERNAL MEDICINE

## 2019-04-05 PROCEDURE — 36415 COLL VENOUS BLD VENIPUNCTURE: CPT | Performed by: INTERNAL MEDICINE

## 2019-04-05 PROCEDURE — 80048 BASIC METABOLIC PNL TOTAL CA: CPT

## 2019-04-08 ENCOUNTER — HOSPITAL ENCOUNTER (OUTPATIENT)
Dept: CARDIOLOGY | Age: 84
Discharge: HOME | End: 2019-04-08
Payer: MEDICARE

## 2019-04-08 ENCOUNTER — OFFICE VISIT (OUTPATIENT)
Dept: VASCULAR SURGERY | Age: 84
End: 2019-04-08
Payer: MEDICARE

## 2019-04-08 DIAGNOSIS — I65.23 ASYMPTOMATIC BILATERAL CAROTID ARTERY STENOSIS: Primary | ICD-10-CM

## 2019-04-08 DIAGNOSIS — I65.23 BILATERAL CAROTID ARTERY STENOSIS: ICD-10-CM

## 2019-04-08 PROCEDURE — 1123F ACP DISCUSS/DSCN MKR DOCD: CPT | Performed by: SURGERY

## 2019-04-08 PROCEDURE — 4004F PT TOBACCO SCREEN RCVD TLK: CPT | Performed by: SURGERY

## 2019-04-08 PROCEDURE — G8427 DOCREV CUR MEDS BY ELIG CLIN: HCPCS | Performed by: SURGERY

## 2019-04-08 PROCEDURE — 99213 OFFICE O/P EST LOW 20 MIN: CPT | Performed by: SURGERY

## 2019-04-08 PROCEDURE — G8598 ASA/ANTIPLAT THER USED: HCPCS | Performed by: SURGERY

## 2019-04-08 PROCEDURE — 93880 EXTRACRANIAL BILAT STUDY: CPT

## 2019-04-08 PROCEDURE — G8419 CALC BMI OUT NRM PARAM NOF/U: HCPCS | Performed by: SURGERY

## 2019-04-08 PROCEDURE — 4040F PNEUMOC VAC/ADMIN/RCVD: CPT | Performed by: SURGERY

## 2019-04-08 RX ORDER — ALOGLIPTIN 12.5 MG/1
12.5 TABLET, FILM COATED ORAL DAILY
COMMUNITY

## 2019-04-08 NOTE — PATIENT INSTRUCTIONS
Patient Education        Carotid Stenosis: Care Instructions  Your Care Instructions    Carotid stenosis is narrowing of one or both of the carotid arteries. These arteries take blood from the heart to the brain. There is one on each side of the neck. A substance called plaque builds up inside an artery. This makes it too narrow. Plaque comes from damage to the artery over time. This damage may be caused by high blood pressure, high cholesterol, diabetes, or smoking. Sometimes plaque can break loose from the carotid artery and move to the brain. This can cause a stroke or transient ischemic attack (TIA). The goal of treatment is to lower your risk of having a stroke or TIA. You can lower your risk by making healthy lifestyle changes and taking medicine. Sometimes a surgery or procedure is done. Follow-up care is a key part of your treatment and safety. Be sure to make and go to all appointments, and call your doctor if you are having problems. It's also a good idea to know your test results and keep a list of the medicines you take. How can you care for yourself at home? · Take your medicines exactly as prescribed. Call your doctor if you think you are having a problem with your medicine. You may take medicine to lower your blood pressure, to lower your cholesterol, or to prevent blood clots. · If you take a blood thinner, such as aspirin, be sure to get instructions about how to take your medicine safely. Blood thinners can cause serious bleeding problems. · Do not smoke. People who smoke have a higher chance of stroke than those who quit. If you need help quitting, talk to your doctor about stop-smoking programs and medicines. These can increase your chances of quitting for good. · Eat a healthy diet that is low in saturated fat and salt. Eat lots of fresh fruits and vegetables and foods high in fiber. · Stay at a healthy weight. Lose weight if you need to.   · Talk to your doctor about starting an exercise program. Regular exercise lowers your chance of stroke. · Limit alcohol to 2 drinks a day for men and 1 drink a day for women. Too much alcohol can cause health problems. · Work with your doctor to control high blood pressure, high cholesterol, diabetes, and other conditions that increase your chance of a stroke. A healthy diet, exercise, weight loss (if needed), and medicines can help. · Avoid colds and flu. Get the flu vaccine every year. When should you call for help? Call 911 anytime you think you may need emergency care. For example, call if:  ? · You passed out (lost consciousness). ? · You have symptoms of a stroke. These may include:  ¨ Sudden numbness, tingling, weakness, or loss of movement in your face, arm, or leg, especially on only one side of your body. ¨ Sudden vision changes. ¨ Sudden trouble speaking. ¨ Sudden confusion or trouble understanding simple statements. ¨ Sudden problems with walking or balance. ¨ A sudden, severe headache that is different from past headaches. ?Call your doctor now or seek immediate medical care if:  ? · You are dizzy or lightheaded, or you feel like you may faint. ? Watch closely for changes in your health, and be sure to contact your doctor if you have any problems. Where can you learn more? Go to https://[employer]Chain.Book [employer] Boat. org and sign in to your Keep[employer] account. Enter P940 in the [facility] box to learn more about \"Carotid Stenosis: Care Instructions. \"     If you do not have an account, please click on the \"Sign Up Now\" link. Current as of: September 21, 2016  Content Version: 11.4  © 9930-2458 [employer], Incorporated. Care instructions adapted under license by [facility] ([facility]). If you have questions about a medical condition or this instruction, always ask your healthcare professional. [place] [address] any warranty or liability for your use of this information.

## 2019-04-08 NOTE — PROGRESS NOTES
Vascular Surgery Outpatient Followup    PCP : Dilip Tyson MD     Previous Vascular Procedures  [facility]  - right carotid endarterectomy 2005  - right carotid stent 2005 - 6 months after cea developed significant restenosis  10/7/14 L CEA    HISTORY OF PRESENT ILLNESS:    The patient is a 80 y.o. male who is here in regards to follow up of their asymptomatic Bilateral carotid stenosis. He has a history of R CEA and stent placement for restenosis and history of a L CEA by me in 10/2014. Patient denies recent hx of stroke, TIA, focal weakness, slurred speech or amaurosis fugax. He is following with Dr. Emilia Diamond from urology re kidney stones. Overall things are better. His legs continue to get weaker. He is using a wheel chair more. He is still using his wheeled walker. He states he feels that both of his legs are slowly becoming weaker, especially at the end of the day. Right leg is worse than left leg. He has a brace on the right leg for foot drop. Patient denies changes in medications    Past Medical History:        Diagnosis Date    Asymptomatic bilateral carotid artery stenosis 1/2/2018    Atrial fibrillation and flutter ([address] 10/4/2014    BPH (benign prostatic hyperplasia)     Carotid artery stenosis     Diabetes mellitus ([address]     Full dentures     History of nephrolithiasis     [place] (hard of [place])     Hyperlipidemia     Hypertension     Macular degeneration     Skin cancer of scalp 07/2014    Unspecified cerebral artery occlusion with cerebral infarction     mini strokes    Uses walker      Past Surgical History:        Procedure Laterality Date    CAROTID ENDARTERECTOMY Left 10-7-14    Dr. Lopez Jerry  11/18/2014    cysto retrograde TURP pyelogram    CYSTOSCOPY Left 07/11/2017    Ureteroscopy; Laser Mqawwooletc-Y-Vkwna on Left    CYSTOSCOPY Left 08/24/2017    Stent removal and replacement    HERNIA REPAIR      KIDNEY STONE SURGERY      SKIN BIOPSY  7/2014    at top of head     Current Medications:   Current Outpatient Medications   Medication Sig Dispense Refill    alogliptin (NESINA) 12.5 MG TABS tablet Take 12.5 mg by mouth daily      warfarin (COUMADIN) 1 MG tablet Take 1 or 2 tablet daily as directed by [address] 150 tablet 3    nitrofurantoin, macrocrystal-monohydrate, (MACROBID) 100 MG capsule Take 100 mg by mouth as needed (UTI sx) Take 100mg BID x3days PRN UTI Sx      sotalol (BETAPACE) 80 MG tablet Take 1.5 tablets by mouth daily 60 tablet 5    insulin glargine (LANTUS SOLOSTAR) 100 UNIT/ML injection pen Inject 25 Units into the skin every morning       lisinopril (PRINIVIL;ZESTRIL) 10 MG tablet Take 10 mg by mouth every morning       mirtazapine (REMERON) 15 MG tablet Take 15 mg by mouth nightly      acetaminophen (TYLENOL) 500 MG tablet Take 1,000 mg by mouth every 6 hours as needed for Pain or Fever       aspirin 81 MG EC tablet Take 81 mg by mouth every morning       Multiple Vitamins-Minerals (PRESERVISION AREDS 2) CAPS Take 1 tablet by mouth 2 times daily       b complex vitamins capsule Take 1 capsule by mouth every morning       simvastatin (ZOCOR) 40 MG tablet Take 40 mg by mouth nightly.  folic acid (FOLVITE) 1 MG tablet Take 1 mg by mouth every morning       silver sulfADIAZINE (SILVADENE) 1 % cream Apply topically daily Apply topically daily.  Insulin Lispro (HUMALOG KWIKPEN SC) Inject into the skin 3 times daily Sliding Scale      saxagliptin (ONGLYZA) 2.5 MG TABS tablet Take 5 mg by mouth every morning        No current facility-administered medications for this visit.       Allergies:  Bacitracin and Percocet [oxycodone-acetaminophen]  Social History     Socioeconomic History    Marital status:      Spouse name: Not on file    Number of children: Not on file    Years of education: Not on file    Highest education level: Not on file   Occupational History    Not on file   Social Needs    Financial resource strain: Not on file    Food insecurity:     Worry: Not on file     Inability: Not on file    Transportation needs:     Medical: Not on file     Non-medical: Not on file   Tobacco Use    Smoking status: Former Smoker     Packs/day: 1.00     Types: Cigarettes     Start date: 1955     Last attempt to quit: 1998     Years since quittin.4    Smokeless tobacco: Current User     Types: Snuff, Chew   Substance and Sexual Activity    Alcohol use: No    Drug use: No    Sexual activity: Never   Lifestyle    Physical activity:     Days per week: Not on file     Minutes per session: Not on file    Stress: Not on file   Relationships    Social connections:     Talks on phone: Not on file     Gets together: Not on file     Attends Restoration service: Not on file     Active member of club or organization: Not on file     Attends meetings of clubs or organizations: Not on file     Relationship status: Not on file    Intimate partner violence:     Fear of current or ex partner: Not on file     Emotionally abused: Not on file     Physically abused: Not on file     Forced sexual activity: Not on file   Other Topics Concern    Not on file   Social History Narrative    Not on file     Family History   Problem Relation Age of Onset    Heart Disease Mother     Heart Disease Father     Heart Disease Brother      Labs  Lab Results   Component Value Date    WBC 6.3 2018    HGB 11.6 (L) 2018    HCT 36.5 (L) 2018     2018    PROTIME 35.1 (H) 2018    INR 3.70 2019    APTT 36.5 (H) 2018    K 4.2 2019    BUN 15 2019    CREATININE 0.8 2019     PHYSICAL EXAM:    CONSTITUTIONAL:  awake, alert, cooperative  Glasses used   Hearing deficits are noted in both ears R>L  EYES:  lids and lashes normal  ENT: external ears and nose without lesions  NECK:  supple, symmetrical, trachea midline  Carotid Bruits are absent  LUNGS:  No increased work of breathing                 Clear to auscultation  CARDIOVASCULAR:  regular rate and rhythm   ABDOMEN:  soft, non-distended, non-tender   Aorta is not palpable  SKIN:  normal skin color, texture, turgor  EXTREMITIES:   R UE 5/5 Strength  L UE 5/5 Strength  R LE Edema not noted  L LE Edema not noted    RADIOLOGY:  Carotid Duplex        R ICA  50-69% stenosis      L ICA  50-69 % stenosis      R Vertebral antegrade flow   L Vertebral antegrade flow    A/P Asymptomatic Bilateral Carotid Stenosis  S/p R CEA at [facility] and carotid stent 6 months later due to significant restenosis   S/p L CEA   · He remains assx  · US from today reveals   · R ICA 50-69%, unchanged, stent patent  · L ICA 50-60%, unchanged    · Continue medical management with ASA and statin  · He is chronically on coumadin for Afib per Dr. Beth Zhu   · Emphasized importance of continued Tobacco cessation  · No indication for surgical intervention at this time  · Discussed with patient pathophysiology of carotid stenosis and all ?s answered  · Discussed with patient symptoms of stroke, TIA and they understood to go to ER immediately if any symptoms developed  · F/U as outpatient in 12 Months with repeat carotid duplex    Virginia Cote

## 2019-04-09 DIAGNOSIS — I48.92 ATRIAL FIBRILLATION AND FLUTTER (HCC): ICD-10-CM

## 2019-04-09 DIAGNOSIS — I48.91 ATRIAL FIBRILLATION AND FLUTTER (HCC): ICD-10-CM

## 2019-04-09 RX ORDER — SOTALOL HYDROCHLORIDE 80 MG/1
120 TABLET ORAL DAILY
Qty: 60 TABLET | Refills: 5 | Status: SHIPPED | OUTPATIENT
Start: 2019-04-09

## 2019-04-09 NOTE — TELEPHONE ENCOUNTER
Requesting Sotalol refill - CrCl calculated off the following information:    Sotalol dosage: 120 mg    Age: 84  Ht: 1.702 m  Wt: 78.9 kg  Cr: 0.8 mg/dl (based off labs on 04/19)  CrCl: 64.28 mL/min

## 2019-04-11 ENCOUNTER — HOSPITAL ENCOUNTER (OUTPATIENT)
Dept: PHARMACY | Age: 84
Setting detail: THERAPIES SERIES
Discharge: HOME | End: 2019-04-11
Payer: MEDICARE

## 2019-04-11 VITALS — SYSTOLIC BLOOD PRESSURE: 134 MMHG | HEART RATE: 65 BPM | DIASTOLIC BLOOD PRESSURE: 77 MMHG

## 2019-04-11 DIAGNOSIS — Z86.79 HISTORY OF ATRIAL FIBRILLATION: ICD-10-CM

## 2019-04-11 LAB — INTERNATIONAL NORMALIZATION RATIO, POC: 2.6

## 2019-04-11 PROCEDURE — 99211 OFF/OP EST MAY X REQ PHY/QHP: CPT

## 2019-04-11 PROCEDURE — 85610 PROTHROMBIN TIME: CPT

## 2019-04-22 ENCOUNTER — HOSPITAL ENCOUNTER (OUTPATIENT)
Dept: PHARMACY | Age: 84
Setting detail: THERAPIES SERIES
Discharge: HOME | End: 2019-04-22
Payer: MEDICARE

## 2019-04-22 VITALS — SYSTOLIC BLOOD PRESSURE: 151 MMHG | DIASTOLIC BLOOD PRESSURE: 82 MMHG | HEART RATE: 62 BPM

## 2019-04-22 DIAGNOSIS — Z86.79 HISTORY OF ATRIAL FIBRILLATION: ICD-10-CM

## 2019-04-22 LAB — INTERNATIONAL NORMALIZATION RATIO, POC: 2.7

## 2019-04-22 PROCEDURE — 99211 OFF/OP EST MAY X REQ PHY/QHP: CPT

## 2019-04-22 PROCEDURE — 85610 PROTHROMBIN TIME: CPT

## 2019-04-22 NOTE — PROGRESS NOTES
[id number] [facility]    Patient Findings     Positives:   Change in activity (INCREASED WEAKNESS IN LEFT LEG), Change in medications (NITROFURANTOIN STARTED YESTERDAY FOR UTI SX)    Negatives:   Signs/symptoms of thrombosis, Signs/symptoms of bleeding, Laboratory test error suspected, Change in health, Change in alcohol use, Upcoming invasive procedure, Emergency department visit, Upcoming dental procedure, Missed doses, Extra doses, Change in diet/appetite, Hospital admission, Bruising, Other complaints         Patient  reports that he quit smoking about 20 years ago. His smoking use included cigarettes. He started smoking about 63 years ago. He smoked 1.00 pack per day. His smokeless tobacco use includes snuff and chew. Assessment/Plan:  Warfarin indication: AFIB      INR today is therapeutic at 2.7, goal is 2-3    Warfarin Dose: SAME (2MG SUN/THURS; 1 MG ALL OTHER DAYS)    Follow Up: 4 weeks    Patient understands dosing directions and information discussed. Dosing schedule and follow up appointment given to patient. Patient acknowledges working in consult agreement with pharmacist as referred by his/her physician.     Stone Salazar PharmD 4/22/2019 8:58 AM

## 2019-04-29 DIAGNOSIS — Z79.899 ENCOUNTER FOR MONITORING SOTALOL THERAPY: Primary | ICD-10-CM

## 2019-04-29 DIAGNOSIS — Z51.81 ENCOUNTER FOR MONITORING SOTALOL THERAPY: Primary | ICD-10-CM

## 2019-05-20 ENCOUNTER — HOSPITAL ENCOUNTER (OUTPATIENT)
Dept: PHARMACY | Age: 84
Setting detail: THERAPIES SERIES
Discharge: HOME | End: 2019-05-20
Payer: MEDICARE

## 2019-05-20 VITALS — SYSTOLIC BLOOD PRESSURE: 118 MMHG | HEART RATE: 63 BPM | DIASTOLIC BLOOD PRESSURE: 78 MMHG

## 2019-05-20 DIAGNOSIS — Z86.79 HISTORY OF ATRIAL FIBRILLATION: ICD-10-CM

## 2019-05-20 LAB — INTERNATIONAL NORMALIZATION RATIO, POC: 3.4

## 2019-05-20 PROCEDURE — 85610 PROTHROMBIN TIME: CPT

## 2019-05-20 PROCEDURE — 99211 OFF/OP EST MAY X REQ PHY/QHP: CPT

## 2019-05-20 NOTE — PROGRESS NOTES
[id number] [facility]    Patient Findings     Negatives:   Signs/symptoms of thrombosis, Signs/symptoms of bleeding, Laboratory test error suspected, Change in health, Change in alcohol use, Change in activity, Upcoming invasive procedure, Emergency department visit, Upcoming dental procedure, Missed doses, Extra doses, Change in medications, Change in diet/appetite, Hospital admission, Bruising, Other complaints         Patient  reports that he quit smoking about 20 years ago. His smoking use included cigarettes. He started smoking about 63 years ago. He smoked 1.00 pack per day. His smokeless tobacco use includes snuff and chew. Assessment/Plan:  Warfarin indication: atrial fibrillation      INR today is SUPRAtherapeutic at 3.4, goal is 2-3. NO KNOWN CAUSE. Warfarin Dose: HOLD TODAY THEN DECREASE 11% TO 2 MG EVERY SUN. ; 1 MG ALL OTHER DAYS    Follow Up: 2 weeks    Patient understands dosing directions and information discussed. Dosing schedule and follow up appointment given to patient. Patient acknowledges working in consult agreement with pharmacist as referred by his/her physician.     Familia Olsen PharmD 5/20/2019 9:42 AM

## 2019-06-06 ENCOUNTER — HOSPITAL ENCOUNTER (OUTPATIENT)
Dept: PHARMACY | Age: 84
Setting detail: THERAPIES SERIES
Discharge: HOME | End: 2019-06-06
Payer: MEDICARE

## 2019-06-06 VITALS — HEART RATE: 64 BPM | SYSTOLIC BLOOD PRESSURE: 99 MMHG | DIASTOLIC BLOOD PRESSURE: 63 MMHG

## 2019-06-06 DIAGNOSIS — Z86.79 HISTORY OF ATRIAL FIBRILLATION: ICD-10-CM

## 2019-06-06 LAB — INTERNATIONAL NORMALIZATION RATIO, POC: 3.5

## 2019-06-06 PROCEDURE — 99211 OFF/OP EST MAY X REQ PHY/QHP: CPT

## 2019-06-06 PROCEDURE — 85610 PROTHROMBIN TIME: CPT

## 2019-06-06 NOTE — PROGRESS NOTES
[id number] [facility]    Patient Findings     Positives:   Change in medications (OFF ONGLYZA; ON ALOGLIPTIN 12.5 MG DAILY)    Negatives:   Signs/symptoms of thrombosis, Signs/symptoms of bleeding, Laboratory test error suspected, Change in health, Change in alcohol use, Change in activity, Upcoming invasive procedure, Emergency department visit, Upcoming dental procedure, Missed doses, Extra doses, Change in diet/appetite, Hospital admission, Bruising, Other complaints    Comments:   June 18TH - RENAL          Patient  reports that he quit smoking about 20 years ago. His smoking use included cigarettes. He started smoking about 64 years ago. He smoked 1.00 pack per day. His smokeless tobacco use includes snuff and chew. Assessment/Plan:  Warfarin indication: atrial fibrillation      INR today is SUPRAtherapeutic at 3.5, goal is 2-3. CAUSE UNKNOWN. WARFARIN DOSE WAS DECREASED 11% AT LAST VISIT. Warfarin Dose: HOLD TODAY THEN DECREASE 12.5% TO 1 MG DAILY    Follow Up: 2 weeks    Patient understands dosing directions and information discussed. Dosing schedule and follow up appointment given to patient. Patient acknowledges working in consult agreement with pharmacist as referred by his/her physician.     Familia Olsen PharmD 6/6/2019 11:35 AM

## 2019-06-18 ENCOUNTER — HOSPITAL ENCOUNTER (OUTPATIENT)
Age: 84
Discharge: HOME | End: 2019-06-18
Payer: MEDICARE

## 2019-06-18 ENCOUNTER — HOSPITAL ENCOUNTER (OUTPATIENT)
Dept: NUCLEAR MEDICINE | Age: 84
Discharge: HOME | End: 2019-06-18
Payer: MEDICARE

## 2019-06-18 ENCOUNTER — HOSPITAL ENCOUNTER (OUTPATIENT)
Dept: GENERAL RADIOLOGY | Age: 84
Discharge: HOME | End: 2019-06-20
Payer: MEDICARE

## 2019-06-18 DIAGNOSIS — R52 PAIN: ICD-10-CM

## 2019-06-18 DIAGNOSIS — N20.0 KIDNEY STONE: ICD-10-CM

## 2019-06-18 LAB — PROSTATE SPECIFIC ANTIGEN: 0.16 NG/ML (ref 0–4)

## 2019-06-18 PROCEDURE — G0103 PSA SCREENING: HCPCS

## 2019-06-18 PROCEDURE — A9562 TC99M MERTIATIDE: HCPCS | Performed by: RADIOLOGY

## 2019-06-18 PROCEDURE — 3430000000 HC RX DIAGNOSTIC RADIOPHARMACEUTICAL: Performed by: RADIOLOGY

## 2019-06-18 PROCEDURE — 36415 COLL VENOUS BLD VENIPUNCTURE: CPT

## 2019-06-18 PROCEDURE — 6360000002 HC RX W HCPCS: Performed by: RADIOLOGY

## 2019-06-18 PROCEDURE — 78708 K FLOW/FUNCT IMAGE W/DRUG: CPT

## 2019-06-18 PROCEDURE — 74018 RADEX ABDOMEN 1 VIEW: CPT

## 2019-06-18 RX ORDER — FUROSEMIDE 10 MG/ML
10 INJECTION INTRAMUSCULAR; INTRAVENOUS ONCE
Status: COMPLETED | OUTPATIENT
Start: 2019-06-18 | End: 2019-06-18

## 2019-06-18 RX ADMIN — Medication 10 MILLICURIE: at 10:26

## 2019-06-18 RX ADMIN — FUROSEMIDE 10 MG: 10 INJECTION, SOLUTION INTRAVENOUS at 11:00

## 2019-06-24 ENCOUNTER — HOSPITAL ENCOUNTER (OUTPATIENT)
Dept: PHARMACY | Age: 84
Setting detail: THERAPIES SERIES
Discharge: HOME | End: 2019-06-24
Payer: MEDICARE

## 2019-06-24 VITALS — SYSTOLIC BLOOD PRESSURE: 129 MMHG | DIASTOLIC BLOOD PRESSURE: 73 MMHG | HEART RATE: 67 BPM

## 2019-06-24 DIAGNOSIS — Z86.79 HISTORY OF ATRIAL FIBRILLATION: ICD-10-CM

## 2019-06-24 LAB — INTERNATIONAL NORMALIZATION RATIO, POC: 2

## 2019-06-24 PROCEDURE — 99211 OFF/OP EST MAY X REQ PHY/QHP: CPT

## 2019-06-24 PROCEDURE — 85610 PROTHROMBIN TIME: CPT

## 2019-06-24 NOTE — PROGRESS NOTES
[address] [facility]    Patient Findings     Positives:   Change in health (BLURRY VISION IN RIGHT EYE, TO SEE EYE SPECIALIST TOMORROW)    Negatives:   Signs/symptoms of thrombosis, Signs/symptoms of bleeding, Laboratory test error suspected, Change in alcohol use, Change in activity, Upcoming invasive procedure, Emergency department visit, Upcoming dental procedure, Missed doses, Extra doses, Change in medications, Change in diet/appetite, Hospital admission, Bruising, Other complaints         Patient  reports that he quit smoking about 20 years ago. His smoking use included cigarettes. He started smoking about 64 years ago. He smoked 1.00 pack per day. His smokeless tobacco use includes snuff and chew. Assessment/Plan:  Warfarin indication: atrial fibrillation      INR today is therapeutic at 2, goal is 2-3. Warfarin Dose: same (1 mg daily)    Follow Up: 2.5 weeks    Patient understands dosing directions and information discussed. Dosing schedule and follow up appointment given to patient. Patient acknowledges working in consult agreement with pharmacist as referred by his/her physician.     Cristina Santana PharmD 6/24/2019 10:38 AM

## 2019-07-15 ENCOUNTER — HOSPITAL ENCOUNTER (OUTPATIENT)
Dept: PHARMACY | Age: 84
Setting detail: THERAPIES SERIES
Discharge: HOME | End: 2019-07-15
Payer: MEDICARE

## 2019-07-15 VITALS — DIASTOLIC BLOOD PRESSURE: 66 MMHG | HEART RATE: 65 BPM | SYSTOLIC BLOOD PRESSURE: 104 MMHG

## 2019-07-15 DIAGNOSIS — Z86.79 HISTORY OF ATRIAL FIBRILLATION: ICD-10-CM

## 2019-07-15 LAB — INTERNATIONAL NORMALIZATION RATIO, POC: 2.5

## 2019-07-15 PROCEDURE — 85610 PROTHROMBIN TIME: CPT

## 2019-07-15 PROCEDURE — 99211 OFF/OP EST MAY X REQ PHY/QHP: CPT

## 2019-07-15 NOTE — PROGRESS NOTES
[address] [facility]    Patient Findings     Positives:   Change in health (PATIENT HAS BEEN WEAKER THAN USUAL)    Negatives:   Signs/symptoms of thrombosis, Signs/symptoms of bleeding, Laboratory test error suspected, Change in alcohol use, Change in activity, Upcoming invasive procedure, Emergency department visit, Upcoming dental procedure, Missed doses, Extra doses, Change in medications, Change in diet/appetite, Hospital admission, Bruising, Other complaints    Comments:   PCP VISIT TOMORROW         Patient  reports that he quit smoking about 20 years ago. His smoking use included cigarettes. He started smoking about 64 years ago. He smoked 1.00 pack per day. His smokeless tobacco use includes snuff and chew. Assessment/Plan:  Warfarin indication: atrial fibrillation      INR today is therapeutic at 2.5, goal is 2-3. Warfarin Dose: same (1 mg daily)    Follow Up: 4 weeks    Patient understands dosing directions and information discussed. Dosing schedule and follow up appointment given to patient. Patient acknowledges working in consult agreement with pharmacist as referred by his/her physician.     Joselyn Melissa PharmD 7/15/2019 10:40 AM

## 2019-08-12 ENCOUNTER — HOSPITAL ENCOUNTER (OUTPATIENT)
Dept: PHARMACY | Age: 84
Setting detail: THERAPIES SERIES
Discharge: HOME | End: 2019-08-12
Payer: MEDICARE

## 2019-08-12 VITALS
DIASTOLIC BLOOD PRESSURE: 64 MMHG | HEART RATE: 64 BPM | BODY MASS INDEX: 24.5 KG/M2 | WEIGHT: 156.4 LBS | SYSTOLIC BLOOD PRESSURE: 111 MMHG

## 2019-08-12 DIAGNOSIS — Z86.79 HISTORY OF ATRIAL FIBRILLATION: ICD-10-CM

## 2019-08-12 LAB — INTERNATIONAL NORMALIZATION RATIO, POC: 2.4

## 2019-08-12 PROCEDURE — 99211 OFF/OP EST MAY X REQ PHY/QHP: CPT

## 2019-08-12 PROCEDURE — 85610 PROTHROMBIN TIME: CPT

## 2019-09-12 ENCOUNTER — HOSPITAL ENCOUNTER (OUTPATIENT)
Dept: PHARMACY | Age: 84
Setting detail: THERAPIES SERIES
Discharge: HOME | End: 2019-09-12
Payer: MEDICARE

## 2019-09-12 VITALS — SYSTOLIC BLOOD PRESSURE: 104 MMHG | DIASTOLIC BLOOD PRESSURE: 68 MMHG | HEART RATE: 61 BPM

## 2019-09-12 DIAGNOSIS — Z86.79 HISTORY OF ATRIAL FIBRILLATION: ICD-10-CM

## 2019-09-12 LAB — INTERNATIONAL NORMALIZATION RATIO, POC: 3.2

## 2019-09-12 PROCEDURE — 85610 PROTHROMBIN TIME: CPT

## 2019-09-12 PROCEDURE — 99211 OFF/OP EST MAY X REQ PHY/QHP: CPT

## 2019-09-18 ENCOUNTER — TELEPHONE (OUTPATIENT)
Dept: NON INVASIVE DIAGNOSTICS | Age: 84
End: 2019-09-18

## 2019-09-18 NOTE — TELEPHONE ENCOUNTER
Patient's wife called in stating that she is unable to get patient to the [address] to have his INR's drawn due to his health. Patient is also unable to afford Eliquis or Xarelto, [facility] Alan[facility] Pad[facility]) they said that if we did decided to switch his medication we would need paper script, and last OV note. Please advise, thanks.

## 2019-09-19 NOTE — TELEPHONE ENCOUNTER
Please advise if OK to switch to Eliquis or Xarelto. We will then send a note to [place] along with paper script so they can provide medication to patient.

## 2019-11-14 PROBLEM — M48.00 SPINAL STENOSIS: Status: ACTIVE | Noted: 2019-01-01

## 2019-11-15 PROBLEM — R31.21 ASYMPTOMATIC MICROSCOPIC HEMATURIA: Status: ACTIVE | Noted: 2019-01-01

## 2019-11-15 PROBLEM — S41.111A SKIN TEAR OF RIGHT UPPER EXTREMITY: Status: ACTIVE | Noted: 2019-01-01

## 2019-11-15 PROBLEM — L89.152 PRESSURE INJURY OF SACRAL REGION, STAGE 2 (HCC): Status: ACTIVE | Noted: 2019-01-01

## 2019-11-15 PROBLEM — R26.2 AMBULATORY DYSFUNCTION: Status: ACTIVE | Noted: 2019-01-01

## 2019-11-15 PROBLEM — Z79.01 ANTICOAGULATED: Status: ACTIVE | Noted: 2019-01-01

## 2019-11-15 PROBLEM — R23.3 SKIN HEMORRHAGE: Status: ACTIVE | Noted: 2019-01-01

## 2019-11-15 PROBLEM — D64.9 ANEMIA: Status: ACTIVE | Noted: 2019-01-01

## 2019-11-15 PROBLEM — R49.0 SOFT HOARSE VOICE: Status: ACTIVE | Noted: 2019-01-01

## 2019-11-15 PROBLEM — E27.8 ADRENAL MASS (HCC): Status: ACTIVE | Noted: 2019-01-01

## 2020-01-01 ENCOUNTER — TELEPHONE (OUTPATIENT)
Dept: NON INVASIVE DIAGNOSTICS | Age: 85
End: 2020-01-01

## 2020-01-01 ENCOUNTER — TELEPHONE (OUTPATIENT)
Dept: VASCULAR SURGERY | Age: 85
End: 2020-01-01

## 2020-04-02 NOTE — TELEPHONE ENCOUNTER
Patient's daughter will call back to reschedule appointment for follow up carotid stenosis, states her father gets confused easily and is physically weak at this time.

## 2021-08-18 PROBLEM — Z86.79 HISTORY OF ATRIAL FIBRILLATION: Status: RESOLVED | Noted: 2018-07-05 | Resolved: 2021-08-18
